# Patient Record
Sex: FEMALE | Race: WHITE | NOT HISPANIC OR LATINO | Employment: OTHER | URBAN - METROPOLITAN AREA
[De-identification: names, ages, dates, MRNs, and addresses within clinical notes are randomized per-mention and may not be internally consistent; named-entity substitution may affect disease eponyms.]

---

## 2021-04-08 DIAGNOSIS — Z23 ENCOUNTER FOR IMMUNIZATION: ICD-10-CM

## 2022-10-05 ENCOUNTER — HOSPITAL ENCOUNTER (INPATIENT)
Facility: HOSPITAL | Age: 77
LOS: 3 days | Discharge: HOSPICE | DRG: 392 | End: 2022-10-09
Attending: EMERGENCY MEDICINE | Admitting: FAMILY MEDICINE
Payer: COMMERCIAL

## 2022-10-05 ENCOUNTER — APPOINTMENT (EMERGENCY)
Dept: RADIOLOGY | Facility: HOSPITAL | Age: 77
DRG: 392 | End: 2022-10-05
Payer: COMMERCIAL

## 2022-10-05 DIAGNOSIS — C34.90 LUNG CANCER (HCC): ICD-10-CM

## 2022-10-05 DIAGNOSIS — A49.8 CLOSTRIDIUM DIFFICILE INFECTION: ICD-10-CM

## 2022-10-05 DIAGNOSIS — R11.2 NAUSEA & VOMITING: Primary | ICD-10-CM

## 2022-10-05 DIAGNOSIS — R53.1 GENERALIZED WEAKNESS: ICD-10-CM

## 2022-10-05 PROBLEM — R11.14 BILIOUS VOMITING WITH NAUSEA: Status: ACTIVE | Noted: 2022-10-05

## 2022-10-05 PROBLEM — J90 PLEURAL EFFUSION: Status: ACTIVE | Noted: 2022-10-05

## 2022-10-05 PROBLEM — E83.42 HYPOMAGNESEMIA: Status: ACTIVE | Noted: 2022-10-05

## 2022-10-05 PROBLEM — A04.72 CLOSTRIDIUM DIFFICILE COLITIS: Status: ACTIVE | Noted: 2022-10-05

## 2022-10-05 LAB
ALBUMIN SERPL BCP-MCNC: 3.4 G/DL (ref 3.5–5)
ALP SERPL-CCNC: 61 U/L (ref 46–116)
ALT SERPL W P-5'-P-CCNC: 11 U/L (ref 12–78)
ANION GAP SERPL CALCULATED.3IONS-SCNC: 8 MMOL/L (ref 4–13)
AST SERPL W P-5'-P-CCNC: 28 U/L (ref 5–45)
BASOPHILS # BLD AUTO: 0.03 THOUSANDS/ΜL (ref 0–0.1)
BASOPHILS NFR BLD AUTO: 0 % (ref 0–1)
BILIRUB SERPL-MCNC: 0.34 MG/DL (ref 0.2–1)
BUN SERPL-MCNC: 9 MG/DL (ref 5–25)
CALCIUM ALBUM COR SERPL-MCNC: 9.1 MG/DL (ref 8.3–10.1)
CALCIUM SERPL-MCNC: 8.6 MG/DL (ref 8.3–10.1)
CHLORIDE SERPL-SCNC: 101 MMOL/L (ref 96–108)
CO2 SERPL-SCNC: 30 MMOL/L (ref 21–32)
CREAT SERPL-MCNC: 0.83 MG/DL (ref 0.6–1.3)
EOSINOPHIL # BLD AUTO: 0.03 THOUSAND/ΜL (ref 0–0.61)
EOSINOPHIL NFR BLD AUTO: 0 % (ref 0–6)
ERYTHROCYTE [DISTWIDTH] IN BLOOD BY AUTOMATED COUNT: 13.3 % (ref 11.6–15.1)
GFR SERPL CREATININE-BSD FRML MDRD: 69 ML/MIN/1.73SQ M
GLUCOSE SERPL-MCNC: 136 MG/DL (ref 65–140)
HCT VFR BLD AUTO: 42.9 % (ref 34.8–46.1)
HGB BLD-MCNC: 13.9 G/DL (ref 11.5–15.4)
IMM GRANULOCYTES # BLD AUTO: 0.06 THOUSAND/UL (ref 0–0.2)
IMM GRANULOCYTES NFR BLD AUTO: 1 % (ref 0–2)
LYMPHOCYTES # BLD AUTO: 0.76 THOUSANDS/ΜL (ref 0.6–4.47)
LYMPHOCYTES NFR BLD AUTO: 8 % (ref 14–44)
MAGNESIUM SERPL-MCNC: 1.5 MG/DL (ref 1.6–2.6)
MCH RBC QN AUTO: 29.9 PG (ref 26.8–34.3)
MCHC RBC AUTO-ENTMCNC: 32.4 G/DL (ref 31.4–37.4)
MCV RBC AUTO: 92 FL (ref 82–98)
MONOCYTES # BLD AUTO: 0.52 THOUSAND/ΜL (ref 0.17–1.22)
MONOCYTES NFR BLD AUTO: 5 % (ref 4–12)
NEUTROPHILS # BLD AUTO: 8.38 THOUSANDS/ΜL (ref 1.85–7.62)
NEUTS SEG NFR BLD AUTO: 86 % (ref 43–75)
NRBC BLD AUTO-RTO: 0 /100 WBCS
PLATELET # BLD AUTO: 301 THOUSANDS/UL (ref 149–390)
PMV BLD AUTO: 8.9 FL (ref 8.9–12.7)
POTASSIUM SERPL-SCNC: 3.7 MMOL/L (ref 3.5–5.3)
PROT SERPL-MCNC: 7.2 G/DL (ref 6.4–8.4)
RBC # BLD AUTO: 4.65 MILLION/UL (ref 3.81–5.12)
SODIUM SERPL-SCNC: 139 MMOL/L (ref 135–147)
WBC # BLD AUTO: 9.78 THOUSAND/UL (ref 4.31–10.16)

## 2022-10-05 PROCEDURE — 85025 COMPLETE CBC W/AUTO DIFF WBC: CPT | Performed by: PHYSICIAN ASSISTANT

## 2022-10-05 PROCEDURE — 70450 CT HEAD/BRAIN W/O DYE: CPT

## 2022-10-05 PROCEDURE — 99220 PR INITIAL OBSERVATION CARE/DAY 70 MINUTES: CPT | Performed by: FAMILY MEDICINE

## 2022-10-05 PROCEDURE — 96360 HYDRATION IV INFUSION INIT: CPT

## 2022-10-05 PROCEDURE — 96374 THER/PROPH/DIAG INJ IV PUSH: CPT

## 2022-10-05 PROCEDURE — 93005 ELECTROCARDIOGRAM TRACING: CPT

## 2022-10-05 PROCEDURE — 99285 EMERGENCY DEPT VISIT HI MDM: CPT

## 2022-10-05 PROCEDURE — 36415 COLL VENOUS BLD VENIPUNCTURE: CPT | Performed by: PHYSICIAN ASSISTANT

## 2022-10-05 PROCEDURE — 96361 HYDRATE IV INFUSION ADD-ON: CPT

## 2022-10-05 PROCEDURE — 80053 COMPREHEN METABOLIC PANEL: CPT | Performed by: PHYSICIAN ASSISTANT

## 2022-10-05 PROCEDURE — 99285 EMERGENCY DEPT VISIT HI MDM: CPT | Performed by: PHYSICIAN ASSISTANT

## 2022-10-05 PROCEDURE — 83735 ASSAY OF MAGNESIUM: CPT | Performed by: PHYSICIAN ASSISTANT

## 2022-10-05 PROCEDURE — 96375 TX/PRO/DX INJ NEW DRUG ADDON: CPT

## 2022-10-05 PROCEDURE — G1004 CDSM NDSC: HCPCS

## 2022-10-05 PROCEDURE — 74177 CT ABD & PELVIS W/CONTRAST: CPT

## 2022-10-05 PROCEDURE — 71260 CT THORAX DX C+: CPT

## 2022-10-05 RX ORDER — ONDANSETRON 2 MG/ML
4 INJECTION INTRAMUSCULAR; INTRAVENOUS EVERY 6 HOURS PRN
Status: DISCONTINUED | OUTPATIENT
Start: 2022-10-05 | End: 2022-10-09 | Stop reason: HOSPADM

## 2022-10-05 RX ORDER — ACETAMINOPHEN 325 MG/1
650 TABLET ORAL EVERY 6 HOURS PRN
Status: DISCONTINUED | OUTPATIENT
Start: 2022-10-05 | End: 2022-10-09 | Stop reason: HOSPADM

## 2022-10-05 RX ORDER — GINSENG 100 MG
1 CAPSULE ORAL ONCE
Status: COMPLETED | OUTPATIENT
Start: 2022-10-05 | End: 2022-10-05

## 2022-10-05 RX ORDER — VANCOMYCIN HYDROCHLORIDE 125 MG/1
125 CAPSULE ORAL
COMMUNITY

## 2022-10-05 RX ORDER — PANTOPRAZOLE SODIUM 40 MG/1
40 TABLET, DELAYED RELEASE ORAL
Status: DISCONTINUED | OUTPATIENT
Start: 2022-10-06 | End: 2022-10-09 | Stop reason: HOSPADM

## 2022-10-05 RX ORDER — MORPHINE SULFATE 15 MG/1
7.5 TABLET ORAL AS NEEDED
COMMUNITY

## 2022-10-05 RX ORDER — CIPROFLOXACIN 2 MG/ML
400 INJECTION, SOLUTION INTRAVENOUS EVERY 12 HOURS
Status: DISCONTINUED | OUTPATIENT
Start: 2022-10-05 | End: 2022-10-09

## 2022-10-05 RX ORDER — METRONIDAZOLE 500 MG/100ML
500 INJECTION, SOLUTION INTRAVENOUS EVERY 8 HOURS
Status: DISCONTINUED | OUTPATIENT
Start: 2022-10-05 | End: 2022-10-09

## 2022-10-05 RX ORDER — HYDROMORPHONE HCL/PF 1 MG/ML
0.2 SYRINGE (ML) INJECTION
Status: DISCONTINUED | OUTPATIENT
Start: 2022-10-05 | End: 2022-10-09 | Stop reason: HOSPADM

## 2022-10-05 RX ORDER — ENOXAPARIN SODIUM 100 MG/ML
40 INJECTION SUBCUTANEOUS DAILY
Status: DISCONTINUED | OUTPATIENT
Start: 2022-10-06 | End: 2022-10-09 | Stop reason: HOSPADM

## 2022-10-05 RX ORDER — HYDROMORPHONE HCL/PF 1 MG/ML
0.5 SYRINGE (ML) INJECTION
Status: DISCONTINUED | OUTPATIENT
Start: 2022-10-05 | End: 2022-10-09 | Stop reason: HOSPADM

## 2022-10-05 RX ORDER — GABAPENTIN 300 MG/1
300 CAPSULE ORAL 2 TIMES DAILY
Status: DISCONTINUED | OUTPATIENT
Start: 2022-10-05 | End: 2022-10-09 | Stop reason: HOSPADM

## 2022-10-05 RX ORDER — MAGNESIUM SULFATE HEPTAHYDRATE 40 MG/ML
2 INJECTION, SOLUTION INTRAVENOUS ONCE
Status: COMPLETED | OUTPATIENT
Start: 2022-10-05 | End: 2022-10-06

## 2022-10-05 RX ORDER — DEXTROSE, SODIUM CHLORIDE, AND POTASSIUM CHLORIDE 5; .9; .15 G/100ML; G/100ML; G/100ML
50 INJECTION INTRAVENOUS CONTINUOUS
Status: DISPENSED | OUTPATIENT
Start: 2022-10-05 | End: 2022-10-06

## 2022-10-05 RX ORDER — HYDROMORPHONE HCL/PF 1 MG/ML
1 SYRINGE (ML) INJECTION ONCE
Status: COMPLETED | OUTPATIENT
Start: 2022-10-05 | End: 2022-10-05

## 2022-10-05 RX ORDER — LEVOTHYROXINE SODIUM 0.05 MG/1
50 TABLET ORAL
Status: DISCONTINUED | OUTPATIENT
Start: 2022-10-06 | End: 2022-10-06

## 2022-10-05 RX ORDER — ONDANSETRON 2 MG/ML
4 INJECTION INTRAMUSCULAR; INTRAVENOUS ONCE
Status: COMPLETED | OUTPATIENT
Start: 2022-10-05 | End: 2022-10-05

## 2022-10-05 RX ADMIN — SODIUM CHLORIDE 1000 ML: 0.9 INJECTION, SOLUTION INTRAVENOUS at 15:55

## 2022-10-05 RX ADMIN — HYDROMORPHONE HYDROCHLORIDE 1 MG: 1 INJECTION, SOLUTION INTRAMUSCULAR; INTRAVENOUS; SUBCUTANEOUS at 15:46

## 2022-10-05 RX ADMIN — Medication 125 MG: at 20:02

## 2022-10-05 RX ADMIN — HYDROMORPHONE HYDROCHLORIDE 0.5 MG: 1 INJECTION, SOLUTION INTRAMUSCULAR; INTRAVENOUS; SUBCUTANEOUS at 23:11

## 2022-10-05 RX ADMIN — BACITRACIN 1 SMALL APPLICATION: 500 OINTMENT TOPICAL at 17:28

## 2022-10-05 RX ADMIN — ONDANSETRON 4 MG: 2 INJECTION INTRAMUSCULAR; INTRAVENOUS at 15:44

## 2022-10-05 RX ADMIN — IOHEXOL 100 ML: 350 INJECTION, SOLUTION INTRAVENOUS at 16:30

## 2022-10-05 RX ADMIN — CIPROFLOXACIN 400 MG: 2 INJECTION, SOLUTION INTRAVENOUS at 20:24

## 2022-10-05 RX ADMIN — MAGNESIUM SULFATE HEPTAHYDRATE 2 G: 40 INJECTION, SOLUTION INTRAVENOUS at 23:50

## 2022-10-05 RX ADMIN — ACETAMINOPHEN 650 MG: 325 TABLET ORAL at 20:02

## 2022-10-05 RX ADMIN — GABAPENTIN 300 MG: 300 CAPSULE ORAL at 20:02

## 2022-10-05 RX ADMIN — Medication 125 MG: at 23:58

## 2022-10-05 RX ADMIN — METRONIDAZOLE 500 MG: 500 SOLUTION INTRAVENOUS at 22:50

## 2022-10-05 RX ADMIN — DEXTROSE, SODIUM CHLORIDE, AND POTASSIUM CHLORIDE 50 ML/HR: 5; .9; .15 INJECTION INTRAVENOUS at 19:12

## 2022-10-05 RX ADMIN — ONDANSETRON 4 MG: 2 INJECTION INTRAMUSCULAR; INTRAVENOUS at 23:12

## 2022-10-05 NOTE — ED NOTES
Unable to access port due to pt having no card to show proof of power port and no hospital documentation to verify       Vidal Nair RN  10/05/22 4723

## 2022-10-05 NOTE — ASSESSMENT & PLAN NOTE
On hospice with [facility]  Sent in by hospice nurse due to above     · Discussed abnormalities noted on CT scan; unclear if these are new or chronic findings  · Obtain medical records for further clarification

## 2022-10-05 NOTE — ASSESSMENT & PLAN NOTE
CT shows small to moderate left-sided pleural effusion   Occasionally uses home O2 at 2 LPM  Currently on 2 LPM with oxygen saturations in the mid 90s     · Continual supplemental oxygen  · Caution with IVF  · Obtain outpatient records to determine if this is acute or chronic in nature

## 2022-10-05 NOTE — ASSESSMENT & PLAN NOTE
Discharged from [facility] on Saturday with C Diff Colitis  Sent home on PO Ciprofloxacin, Flagyl, and Vancomycin  Unsure how much longer she has until she completes her course  CT: "Colonic diverticulosis with mild wall thickening involving the sigmoid colon which may represent acute or chronic changes related to diverticulitis    No pericolonic inflammatory changes are noted "  · Will start IV Ciprofloxacin and Flagyl   · Will start PO Vancomycin  · Recheck C Diff   · Obtain medical records from [facility] to determine length of antibiotics

## 2022-10-05 NOTE — ASSESSMENT & PLAN NOTE
Hst of lung cancer on hospice with recent diagnosis of C Diff Colitis  Discharged home from [facility] on Saturday  Since then she has been nauseous with vomiting and diarrhea  She has been unable to eat or drink  Now with weakness and falls     · Change antibiotics from PO to IV while hospitalized   · Start gentle IVF with D5 NS + 20 mEq KCL at 50 mL/hr for 20 hours   · Clear liquids, toast, and crackers as tolerated   · Supportive care with IV Zofran

## 2022-10-05 NOTE — ASSESSMENT & PLAN NOTE
With falls 2/2 to nausea, vomiting, and diarrhea  Unable to eat or drink for several days    · Supportive care as above  · PT/OT jacquieal

## 2022-10-05 NOTE — ED NOTES
Pt's nurse from [address] [place] [address], Freya Nguyễn [phone], was called to see which kind of port the pt has  She believes it is a power port and medication can be given through it, but blood may not be obtained        Noris Andrade RN  10/05/22 3076

## 2022-10-05 NOTE — ED PROVIDER NOTES
History  Chief Complaint   Patient presents with   • Vomiting     Hospice pt for lung cancer, discharged from [facility] 5 days ago, unable to keep anything down, vomiting, was dx with diverticulitis and c diff     82-year-old female, currently in hospice for lung cancer, presenting today with persistent nausea vomiting and diarrhea over the past week  Was discharged from [facility] 5 days ago, was diagnosed with C diff and diverticulitis  Has been on 3 antibiotics including Flagyl, Cipro and vanco   Has had difficult tolerating any type of medications liquids or solids  Some abdominal discomfort, states that she cannot tolerate the p o  Morphine as it is a liquid  States that she is on hospice however wants treatment for non cancer related issues  Has ongoing abdominal discomfort  Has had multiple falls, states did not hit her head or lose consciousness, she does notice skin tear to the right forearm  Currently does not have any pain  She lives at home by self however does have family members and visiting home nurses and aides that check on her  Denies fevers, shortness breath, chest pain  Prior to Admission Medications   Prescriptions Last Dose Informant Patient Reported? Taking? Ciprofloxacin (CIPRO PO)   Yes Yes   Sig: Take by mouth   metroNIDAZOLE (FLAGYL PO)   Yes Yes   Sig: Take by mouth   morphine (MSIR) 15 mg tablet   Yes Yes   Sig: Take 7 5 mg by mouth as needed for severe pain   vancomycin (VANCOCIN) 125 MG capsule   Yes Yes   Sig: Take 125 mg by mouth      Facility-Administered Medications: None       Past Medical History:   Diagnosis Date   • C  difficile colitis    • Diverticulitis    • Lung cancer [facility])        Past Surgical History:   Procedure Laterality Date   • GASTRIC RESTRICTION SURGERY         History reviewed  No pertinent family history  I have reviewed and agree with the history as documented      E-Cigarette/Vaping   • E-Cigarette Use Never User E-Cigarette/Vaping Substances     Social History     Tobacco Use   • Smoking status: Former Smoker   • Smokeless tobacco: Never Used   Vaping Use   • Vaping Use: Never used   Substance Use Topics   • Alcohol use: Not Currently   • Drug use: Not Currently       Review of Systems   Constitutional: Negative  Negative for chills, fatigue and fever  HENT: Negative  Negative for congestion, postnasal drip, rhinorrhea and sore throat  Eyes: Negative  Respiratory: Negative  Negative for cough, shortness of breath and wheezing  Cardiovascular: Negative  Gastrointestinal: Positive for abdominal pain, diarrhea, nausea and vomiting  Negative for abdominal distention, anal bleeding, blood in stool, constipation and rectal pain  Endocrine: Negative  Genitourinary: Negative  Musculoskeletal: Negative  Skin: Positive for color change and wound  Negative for pallor and rash  Neurological: Negative  Hematological: Negative  Psychiatric/Behavioral: Negative  All other systems reviewed and are negative  Physical Exam  Physical Exam  Vitals and nursing note reviewed  Constitutional:       General: She is not in acute distress  Appearance: She is well-developed  She is ill-appearing  She is not diaphoretic  HENT:      Head: Normocephalic and atraumatic  Right Ear: External ear normal       Left Ear: External ear normal       Nose: Nose normal       Mouth/Throat:      Pharynx: No oropharyngeal exudate  Eyes:      General: No scleral icterus  Right eye: No discharge  Left eye: No discharge  Conjunctiva/sclera: Conjunctivae normal       Pupils: Pupils are equal, round, and reactive to light  Cardiovascular:      Rate and Rhythm: Normal rate and regular rhythm  Pulses: Normal pulses  Heart sounds: Normal heart sounds  No murmur heard  No friction rub  No gallop  Pulmonary:      Effort: Pulmonary effort is normal  No respiratory distress  Breath sounds: Normal breath sounds  No stridor  No wheezing, rhonchi or rales  Comments: S PO2 is 97% indicating adequate oxygenation  Chest:      Chest wall: No tenderness  Abdominal:      General: Abdomen is flat  Bowel sounds are normal  There is no distension  Palpations: Abdomen is soft  There is no mass  Tenderness: There is no abdominal tenderness  There is no guarding or rebound  Hernia: No hernia is present  Comments: No visible abnormality, decreased bowel sounds minimal crampy-like tenderness diffusely  Musculoskeletal:      Cervical back: Normal range of motion and neck supple  Lymphadenopathy:      Cervical: No cervical adenopathy  Skin:     General: Skin is warm and dry  Capillary Refill: Capillary refill takes less than 2 seconds  Coloration: Skin is not jaundiced or pale  Findings: No bruising, erythema, lesion or rash  Neurological:      General: No focal deficit present  Mental Status: She is alert and oriented to person, place, and time  Mental status is at baseline           Vital Signs  ED Triage Vitals [10/05/22 1437]   Temperature Pulse Respirations Blood Pressure SpO2   98 9 °F (37 2 °C) 69 22 (!) 187/94 97 %      Temp Source Heart Rate Source Patient Position - Orthostatic VS BP Location FiO2 (%)   Tympanic Monitor Sitting Left arm --      Pain Score       7           Vitals:    10/05/22 1437 10/05/22 1600 10/05/22 1645   BP: (!) 187/94 118/64 142/72   Pulse: 69 76 69   Patient Position - Orthostatic VS: Sitting  Lying         Visual Acuity      ED Medications  Medications   sodium chloride 0 9 % bolus 1,000 mL (1,000 mL Intravenous New Bag 10/5/22 1555)   ondansetron (ZOFRAN) injection 4 mg (4 mg Intravenous Given 10/5/22 1544)   HYDROmorphone (DILAUDID) injection 1 mg (1 mg Intravenous Given 10/5/22 1546)   iohexol (OMNIPAQUE) 350 MG/ML injection (SINGLE-DOSE) 100 mL (100 mL Intravenous Given 10/5/22 1630)   bacitracin topical ointment 1 small application (1 small application Topical Given 10/5/22 8943)       Diagnostic Studies  Results Reviewed     Procedure Component Value Units Date/Time    Comprehensive metabolic panel [[id number]]  (Abnormal) Collected: 10/05/22 1549    Lab Status: Final result Specimen: Blood from Arm, Left Updated: 10/05/22 1609     Sodium 139 mmol/L      Potassium 3 7 mmol/L      Chloride 101 mmol/L      CO2 30 mmol/L      ANION GAP 8 mmol/L      BUN 9 mg/dL      Creatinine 0 83 mg/dL      Glucose 136 mg/dL      Calcium 8 6 mg/dL      Corrected Calcium 9 1 mg/dL      AST 28 U/L      ALT 11 U/L      Alkaline Phosphatase 61 U/L      Total Protein 7 2 g/dL      Albumin 3 4 g/dL      Total Bilirubin 0 34 mg/dL      eGFR 69 ml/min/1 73sq m     Narrative:      National Kid[employer] Disease Foundation guidelines for Chronic Kidney Disease (CKD):   •  Stage 1 with normal or high GFR (GFR > 90 mL/min/1 73 square meters)  •  Stage 2 Mild CKD (GFR = 60-89 mL/min/1 73 square meters)  •  Stage 3A Moderate CKD (GFR = 45-59 mL/min/1 73 square meters)  •  Stage 3B Moderate CKD (GFR = 30-44 mL/min/1 73 square meters)  •  Stage 4 Severe CKD (GFR = 15-29 mL/min/1 73 square meters)  •  Stage 5 End Stage CKD (GFR <15 mL/min/1 73 square meters)  Note: GFR calculation is accurate only with a steady state creatinine    Magnesium [[id number]]  (Abnormal) Collected: 10/05/22 1549    Lab Status: Final result Specimen: Blood from Arm, Left Updated: 10/05/22 1609     Magnesium 1 5 mg/dL     CBC and differential [[id number]]  (Abnormal) Collected: 10/05/22 1549    Lab Status: Final result Specimen: Blood from Arm, Left Updated: 10/05/22 1554     WBC 9 78 Thousand/uL      RBC 4 65 Million/uL      Hemoglobin 13 9 g/dL      Hematocrit 42 9 %      MCV 92 fL      MCH 29 9 pg      MCHC 32 4 g/dL      RDW 13 3 %      MPV 8 9 fL      Platelets 533 Thousands/uL      nRBC 0 /100 WBCs      Neutrophils Relative 86 %      Immat GRANS % 1 %      Lymphocytes Relative 8 % Monocytes Relative 5 %      Eosinophils Relative 0 %      Basophils Relative 0 %      Neutrophils Absolute 8 38 Thousands/µL      Immature Grans Absolute 0 06 Thousand/uL      Lymphocytes Absolute 0 76 Thousands/µL      Monocytes Absolute 0 52 Thousand/µL      Eosinophils Absolute 0 03 Thousand/µL      Basophils Absolute 0 03 Thousands/µL     Clostridium difficile toxin by PCR with EIA [[id number]]     Lab Status: No result Specimen: Stool from Per Rectum                  CT head without contrast   Final Result by Jb Ruth MD (10/05 1658)      Hypodense areas in the left frontal and left occipital lobes which may represent prior infarcts, however, metastatic disease is not excluded in this setting and consider MRI for further evaluation if clinically indicated  Any prior studies would    certainly be helpful in this regard  The study was marked in EPIC for significant notification and follow-up notification  Workstation performed: [id number]         CT chest abdomen pelvis w contrast   Final Result by Jb Ruth MD (10/05 1656)      Background emphysema with postsurgical changes in the left upper lobe  Complete collapse of the left upper lobe with obstruction of the left upper lobe bronchus for which underlying endobronchial lesion is not excluded  Prior exams would be helpful to    assess for stability  Multiple nodules scattered throughout the right lung the largest in the right lower lobe measuring 5 mm  Continued follow-up suggested  Small to moderate left-sided pleural effusion of uncertain etiology  Colonic diverticulosis with mild wall thickening involving the sigmoid colon which may represent acute or chronic changes related to diverticulitis  No pericolonic inflammatory changes are noted              Workstation performed: [id number]                    Procedures  ECG 12 Lead Documentation Only    Date/Time: 10/5/2022 4:12 PM  Performed by: [facility], [facility]  Authorized by: Rupa Cohen PA-C     Indications / Diagnosis:  Asses QT interval   Patient location:  ED  Interpretation:     Interpretation: normal    Rate:     ECG rate:  73    ECG rate assessment: normal    Rhythm:     Rhythm: sinus rhythm    Ectopy:     Ectopy: none    QRS:     QRS axis:  Normal    QRS intervals:  Normal  Conduction:     Conduction: normal    ST segments:     ST segments:  Non-specific  T waves:     T waves: flattening               ED Course  ED Course as of 10/05/22 1748   Wed Oct 05, 2022   1646 Patient updated  Back from CT scan  MDM  Number of Diagnoses or Management Options  Clostridium difficile infection  Generalized weakness  Nausea & vomiting  Diagnosis management comments: Given fall risk, significant weakness as well as persistent vomiting and diarrhea while at home with difficulty tolerating any medications or food, patient would like to be admitted overnight for observation  Patient once again states that she still is on hospice, does not want any treatment for her cancer related issues, however is willing to be treated for any type of treatable illness such as C diff  Potential brain mets, no shift noted on CT scan          Amount and/or Complexity of Data Reviewed  Clinical lab tests: ordered and reviewed  Tests in the radiology section of CPT®: ordered and reviewed  Review and summarize past medical records: yes  Discuss the patient with other providers: yes  Independent visualization of images, tracings, or specimens: yes        Disposition  Final diagnoses:   Nausea & vomiting   Clostridium difficile infection   Generalized weakness     Time reflects when diagnosis was documented in both MDM as applicable and the Disposition within this note     Time User Action Codes Description Comment    10/5/2022  5:42 PM Dar Babin Add [R11 2] Nausea & vomiting     10/5/2022  5:43 PM Dar Babin Add [A49 8] Clostridium difficile infection     10/5/2022  5:43 PM Montez Lira Marcela [R53 1] Generalized weakness       ED Disposition     ED Disposition   Admit    Condition   Stable    Date/Time   Wed Oct 5, 2022  5:42 PM    Comment   Case was discussed with Dr Kasandra Sagastume and the patient's admission status was agreed to be Admission Status: observation status to the service of Dr Kasandra Sagastume   Follow-up Information    None         Patient's Medications   Discharge Prescriptions    No medications on file       No discharge procedures on file      PDMP Review     None          ED Provider  Electronically Signed by           [facility]  10/05/22 5186

## 2022-10-05 NOTE — H&P
Kailash 47  H&P- Simon Apgar 1945, 78 y o  female MRN: [id number]  Unit/Bed#: ED 05 Encounter: [id number]  Primary Care Provider: No primary care provider on file  Date and time admitted to hospital: 10/5/2022  2:42 PM    Bilious vomiting with nausea  Assessment & Plan  Hst of lung cancer on hospice with recent diagnosis of C Diff Colitis  Discharged home from [facility] on Saturday  Since then she has been nauseous with vomiting and diarrhea  She has been unable to eat or drink  Now with weakness and falls  · Change antibiotics from PO to IV while hospitalized   · Start gentle IVF with D5 NS + 20 mEq KCL at 50 mL/hr for 20 hours   · Clear liquids, toast, and crackers as tolerated   · Supportive care with IV Zofran     Clostridium difficile colitis  Assessment & Plan  Discharged from [facility] on Saturday with C Diff Colitis  Sent home on PO Ciprofloxacin, Flagyl, and Vancomycin  Unsure how much longer she has until she completes her course  CT: "Colonic diverticulosis with mild wall thickening involving the sigmoid colon which may represent acute or chronic changes related to diverticulitis  No pericolonic inflammatory changes are noted "  · Will start IV Ciprofloxacin and Flagyl   · Will start PO Vancomycin  · Recheck C Diff   · Obtain medical records from [facility] to determine length of antibiotics     Hypomagnesemia  Assessment & Plan  Mag 1 5, likely due to N/V/D   · Replete with Mag Sulfate 2 gm IV x1  · Replete potassium     Pleural effusion  Assessment & Plan  CT shows small to moderate left-sided pleural effusion   Occasionally uses home O2 at 2 LPM  Currently on 2 LPM with oxygen saturations in the mid 90s  · Continual supplemental oxygen  · Caution with IVF  · Obtain outpatient records to determine if this is acute or chronic in nature     Lung cancer [facility])  Assessment & Plan  On hospice with [facility]  Sent in by hospice nurse due to above     · Discussed abnormalities noted on CT scan; unclear if these are new or chronic findings  · Obtain medical records for further clarification     Generalized weakness  Assessment & Plan  With falls 2/2 to nausea, vomiting, and diarrhea  Unable to eat or drink for several days  · Supportive care as above  · PT/OT eval      VTE Pharmacologic Prophylaxis:   Moderate Risk (Score 3-4) - Pharmacological DVT Prophylaxis Ordered: enoxaparin (Lovenox)  Code Status: Level 3 - DNAR and DNI level 3  Discussion with family: Patient declined call to   Anticipated Length of Stay: Patient will be admitted on an observation basis with an anticipated length of stay of less than 2 midnights secondary to N/V, colitis   Total Time for Visit, including Counseling / Coordination of Care: 60 minutes Greater than 50% of this total time spent on direct patient counseling and coordination of care  Chief Complaint: nausea, vomiting, diarrhea, unable to eat or drinking     History of Present Illness:  Amanda Miller is a 78 y o  female with a PMH of lung cancer on hospice, GERD< hypothyroidism who presents with nausea, vomiting, and diarrhea  Patient was recently admitted to [facility] for C  Diff Colitis  She was discharged home on Saturday on Flagyl, Cipro, and Vancomycin  She has not been able to eat or drinking since coming home  She is now very weak and falling  Her hospice nurse recommended seeking medical attention at [facility] as she was unhappy with her care at [facility]  Workup revealed Mag 1 5  CT of chest with collapse of left upper lobe with obstruction of the left upper lobe bronchus, unclear if this is acute or chronic  CT abdomen revealed colonic diverticulosis with mild wall thickening  CT head revealed hypodense areas in the left frontal and left occipital lobes which may represent prior infarcts vs metastasis  Reviewed CT findings with patient   After discussion, the plan will be to obtain medical records from [facility] for comparison  At this time, patient does not want to be aggressive with her lung cancer  She wants to focus on treatment for her colitis  Review of Systems:  Review of Systems   Constitutional: Positive for activity change, appetite change and fatigue  Negative for chills and fever  HENT: Negative for sore throat  Eyes: Negative for visual disturbance  Respiratory: Negative for cough, shortness of breath and wheezing  Cardiovascular: Negative for chest pain, palpitations and leg swelling  Gastrointestinal: Positive for abdominal pain, diarrhea, nausea and vomiting  Negative for abdominal distention  Genitourinary: Negative for dysuria and hematuria  Musculoskeletal: Positive for gait problem  Negative for arthralgias and back pain  Skin: Negative for rash and wound  Neurological: Positive for weakness  Negative for seizures and syncope  Psychiatric/Behavioral: Negative for confusion  All other systems reviewed and are negative  Past Medical and Surgical History:   Past Medical History:   Diagnosis Date   • C  difficile colitis    • Diverticulitis    • Lung cancer [facility])        Past Surgical History:   Procedure Laterality Date   • GASTRIC RESTRICTION SURGERY         Meds/Allergies:  Prior to Admission medications    Medication Sig Start Date End Date Taking? Authorizing Provider   Ciprofloxacin (CIPRO PO) Take by mouth   Yes Historical Provider, MD   metroNIDAZOLE (FLAGYL PO) Take by mouth   Yes Historical Provider, MD   morphine (MSIR) 15 mg tablet Take 7 5 mg by mouth as needed for severe pain   Yes Historical Provider, MD   vancomycin (VANCOCIN) 125 MG capsule Take 125 mg by mouth   Yes Historical Provider, MD     I have reviewed home medications with patient personally      Allergies: No Known Allergies    Social History:  Marital Status: Unknown   Occupation: retired   Patient Pre-hospital Living Situation: Home  Patient Pre-hospital Level of Mobility: walks  Patient Pre-hospital Diet Restrictions: none  Substance Use History:   Social History     Substance and Sexual Activity   Alcohol Use Not Currently     Social History     Tobacco Use   Smoking Status Former Smoker   Smokeless Tobacco Never Used     Social History     Substance and Sexual Activity   Drug Use Not Currently       Family History:  History reviewed  No pertinent family history  Physical Exam:     Vitals:   Blood Pressure: 142/72 (10/05/22 1645)  Pulse: 69 (10/05/22 1645)  Temperature: 98 9 °F (37 2 °C) (10/05/22 1437)  Temp Source: Tympanic (10/05/22 1437)  Respirations: 20 (10/05/22 1645)  SpO2: 99 % (10/05/22 1645)    Physical Exam  Vitals and nursing note reviewed  Constitutional:       General: She is not in acute distress  Appearance: She is obese  She is ill-appearing  She is not toxic-appearing or diaphoretic  Comments: Pleasant female resting in bed on 2L NC   HENT:      Head: Normocephalic  Mouth/Throat:      Mouth: Mucous membranes are dry  Eyes:      Conjunctiva/sclera: Conjunctivae normal    Cardiovascular:      Rate and Rhythm: Normal rate  Pulmonary:      Effort: Pulmonary effort is normal  No respiratory distress  Breath sounds: Normal breath sounds  No wheezing, rhonchi or rales  Abdominal:      General: Bowel sounds are normal  There is no distension  Palpations: Abdomen is soft  Tenderness: There is abdominal tenderness  There is no guarding or rebound  Musculoskeletal:         General: Normal range of motion  Cervical back: Normal range of motion  Right lower leg: No edema  Left lower leg: No edema  Skin:     General: Skin is warm and dry  Capillary Refill: Capillary refill takes less than 2 seconds  Neurological:      Mental Status: She is alert and oriented to person, place, and time  Mental status is at baseline  Motor: Weakness present     Psychiatric:         Mood and Affect: Mood normal          Behavior: Behavior normal  Thought Content: Thought content normal           Additional Data:     Lab Results:  Results from last 7 days   Lab Units 10/05/22  1549   WBC Thousand/uL 9 78   HEMOGLOBIN g/dL 13 9   HEMATOCRIT % 42 9   PLATELETS Thousands/uL 301   NEUTROS PCT % 86*   LYMPHS PCT % 8*   MONOS PCT % 5   EOS PCT % 0     Results from last 7 days   Lab Units 10/05/22  1549   SODIUM mmol/L 139   POTASSIUM mmol/L 3 7   CHLORIDE mmol/L 101   CO2 mmol/L 30   BUN mg/dL 9   CREATININE mg/dL 0 83   ANION GAP mmol/L 8   CALCIUM mg/dL 8 6   ALBUMIN g/dL 3 4*   TOTAL BILIRUBIN mg/dL 0 34   ALK PHOS U/L 61   ALT U/L 11*   AST U/L 28   GLUCOSE RANDOM mg/dL 136                       Imaging: Reviewed radiology reports from this admission including: chest CT scan, abdominal/pelvic CT and CT head  CT head without contrast   Final Result by Juan Elizabeth MD (10/05 1658)      Hypodense areas in the left frontal and left occipital lobes which may represent prior infarcts, however, metastatic disease is not excluded in this setting and consider MRI for further evaluation if clinically indicated  Any prior studies would    certainly be helpful in this regard  The study was marked in EPIC for significant notification and follow-up notification  Workstation performed: [id number]         CT chest abdomen pelvis w contrast   Final Result by Juan Elizabeth MD (10/05 1656)      Background emphysema with postsurgical changes in the left upper lobe  Complete collapse of the left upper lobe with obstruction of the left upper lobe bronchus for which underlying endobronchial lesion is not excluded  Prior exams would be helpful to    assess for stability  Multiple nodules scattered throughout the right lung the largest in the right lower lobe measuring 5 mm  Continued follow-up suggested  Small to moderate left-sided pleural effusion of uncertain etiology        Colonic diverticulosis with mild wall thickening involving the sigmoid colon which may represent acute or chronic changes related to diverticulitis  No pericolonic inflammatory changes are noted  Workstation performed: [id number]             EKG and Other Studies Reviewed on Admission:   · EKG: NSR  HR 60s  ** Please Note: This note has been constructed using a voice recognition system   **

## 2022-10-06 PROBLEM — B37.0 ORAL THRUSH: Status: ACTIVE | Noted: 2022-10-06

## 2022-10-06 LAB
ALBUMIN SERPL BCP-MCNC: 3 G/DL (ref 3.5–5)
ALP SERPL-CCNC: 56 U/L (ref 46–116)
ALT SERPL W P-5'-P-CCNC: 12 U/L (ref 12–78)
ANION GAP SERPL CALCULATED.3IONS-SCNC: 4 MMOL/L (ref 4–13)
AST SERPL W P-5'-P-CCNC: 22 U/L (ref 5–45)
BASOPHILS # BLD AUTO: 0.05 THOUSANDS/ΜL (ref 0–0.1)
BASOPHILS NFR BLD AUTO: 1 % (ref 0–1)
BILIRUB SERPL-MCNC: 0.36 MG/DL (ref 0.2–1)
BUN SERPL-MCNC: 6 MG/DL (ref 5–25)
CALCIUM ALBUM COR SERPL-MCNC: 9.2 MG/DL (ref 8.3–10.1)
CALCIUM SERPL-MCNC: 8.4 MG/DL (ref 8.3–10.1)
CHLORIDE SERPL-SCNC: 102 MMOL/L (ref 96–108)
CO2 SERPL-SCNC: 33 MMOL/L (ref 21–32)
CREAT SERPL-MCNC: 0.8 MG/DL (ref 0.6–1.3)
EOSINOPHIL # BLD AUTO: 0.17 THOUSAND/ΜL (ref 0–0.61)
EOSINOPHIL NFR BLD AUTO: 2 % (ref 0–6)
ERYTHROCYTE [DISTWIDTH] IN BLOOD BY AUTOMATED COUNT: 13.5 % (ref 11.6–15.1)
GFR SERPL CREATININE-BSD FRML MDRD: 72 ML/MIN/1.73SQ M
GLUCOSE P FAST SERPL-MCNC: 110 MG/DL (ref 65–99)
GLUCOSE SERPL-MCNC: 110 MG/DL (ref 65–140)
HCT VFR BLD AUTO: 40.8 % (ref 34.8–46.1)
HGB BLD-MCNC: 12.8 G/DL (ref 11.5–15.4)
IMM GRANULOCYTES # BLD AUTO: 0.02 THOUSAND/UL (ref 0–0.2)
IMM GRANULOCYTES NFR BLD AUTO: 0 % (ref 0–2)
LYMPHOCYTES # BLD AUTO: 1.38 THOUSANDS/ΜL (ref 0.6–4.47)
LYMPHOCYTES NFR BLD AUTO: 16 % (ref 14–44)
MAGNESIUM SERPL-MCNC: 1.9 MG/DL (ref 1.6–2.6)
MCH RBC QN AUTO: 29.5 PG (ref 26.8–34.3)
MCHC RBC AUTO-ENTMCNC: 31.4 G/DL (ref 31.4–37.4)
MCV RBC AUTO: 94 FL (ref 82–98)
MONOCYTES # BLD AUTO: 0.74 THOUSAND/ΜL (ref 0.17–1.22)
MONOCYTES NFR BLD AUTO: 9 % (ref 4–12)
NEUTROPHILS # BLD AUTO: 6.04 THOUSANDS/ΜL (ref 1.85–7.62)
NEUTS SEG NFR BLD AUTO: 72 % (ref 43–75)
NRBC BLD AUTO-RTO: 0 /100 WBCS
PHOSPHATE SERPL-MCNC: 2.6 MG/DL (ref 2.3–4.1)
PLATELET # BLD AUTO: 287 THOUSANDS/UL (ref 149–390)
PMV BLD AUTO: 9 FL (ref 8.9–12.7)
POTASSIUM SERPL-SCNC: 3.3 MMOL/L (ref 3.5–5.3)
PROT SERPL-MCNC: 6.2 G/DL (ref 6.4–8.4)
RBC # BLD AUTO: 4.34 MILLION/UL (ref 3.81–5.12)
SODIUM SERPL-SCNC: 139 MMOL/L (ref 135–147)
WBC # BLD AUTO: 8.4 THOUSAND/UL (ref 4.31–10.16)

## 2022-10-06 PROCEDURE — 99232 SBSQ HOSP IP/OBS MODERATE 35: CPT | Performed by: NURSE PRACTITIONER

## 2022-10-06 PROCEDURE — 84100 ASSAY OF PHOSPHORUS: CPT | Performed by: NURSE PRACTITIONER

## 2022-10-06 PROCEDURE — 97163 PT EVAL HIGH COMPLEX 45 MIN: CPT

## 2022-10-06 PROCEDURE — 80053 COMPREHEN METABOLIC PANEL: CPT | Performed by: NURSE PRACTITIONER

## 2022-10-06 PROCEDURE — 85025 COMPLETE CBC W/AUTO DIFF WBC: CPT | Performed by: NURSE PRACTITIONER

## 2022-10-06 PROCEDURE — 83735 ASSAY OF MAGNESIUM: CPT | Performed by: NURSE PRACTITIONER

## 2022-10-06 RX ORDER — LEVOTHYROXINE SODIUM 0.12 MG/1
125 TABLET ORAL DAILY
COMMUNITY

## 2022-10-06 RX ORDER — ALBUTEROL SULFATE 90 UG/1
2 AEROSOL, METERED RESPIRATORY (INHALATION) EVERY 4 HOURS PRN
Status: DISCONTINUED | OUTPATIENT
Start: 2022-10-06 | End: 2022-10-09 | Stop reason: HOSPADM

## 2022-10-06 RX ORDER — MAGNESIUM HYDROXIDE/ALUMINUM HYDROXICE/SIMETHICONE 120; 1200; 1200 MG/30ML; MG/30ML; MG/30ML
30 SUSPENSION ORAL EVERY 4 HOURS PRN
Status: DISCONTINUED | OUTPATIENT
Start: 2022-10-06 | End: 2022-10-09 | Stop reason: HOSPADM

## 2022-10-06 RX ORDER — METOCLOPRAMIDE HYDROCHLORIDE 5 MG/ML
10 INJECTION INTRAMUSCULAR; INTRAVENOUS EVERY 6 HOURS PRN
Status: DISCONTINUED | OUTPATIENT
Start: 2022-10-06 | End: 2022-10-07

## 2022-10-06 RX ORDER — POTASSIUM CHLORIDE 20 MEQ/1
40 TABLET, EXTENDED RELEASE ORAL ONCE
Status: COMPLETED | OUTPATIENT
Start: 2022-10-06 | End: 2022-10-06

## 2022-10-06 RX ORDER — DEXTROSE, SODIUM CHLORIDE, AND POTASSIUM CHLORIDE 5; .9; .15 G/100ML; G/100ML; G/100ML
50 INJECTION INTRAVENOUS CONTINUOUS
Status: DISCONTINUED | OUTPATIENT
Start: 2022-10-06 | End: 2022-10-07

## 2022-10-06 RX ORDER — LEVOTHYROXINE SODIUM 0.12 MG/1
125 TABLET ORAL
Status: DISCONTINUED | OUTPATIENT
Start: 2022-10-07 | End: 2022-10-09 | Stop reason: HOSPADM

## 2022-10-06 RX ORDER — FLUOXETINE HYDROCHLORIDE 20 MG/1
40 CAPSULE ORAL DAILY
Status: DISCONTINUED | OUTPATIENT
Start: 2022-10-06 | End: 2022-10-09 | Stop reason: HOSPADM

## 2022-10-06 RX ORDER — AMLODIPINE BESYLATE AND BENAZEPRIL HYDROCHLORIDE 5; 10 MG/1; MG/1
1 CAPSULE ORAL DAILY
COMMUNITY

## 2022-10-06 RX ORDER — ALPRAZOLAM 0.5 MG/1
0.5 TABLET ORAL 3 TIMES DAILY PRN
Status: DISCONTINUED | OUTPATIENT
Start: 2022-10-06 | End: 2022-10-09 | Stop reason: HOSPADM

## 2022-10-06 RX ORDER — CLOTRIMAZOLE 10 MG/1
10 LOZENGE ORAL; TOPICAL
Status: DISCONTINUED | OUTPATIENT
Start: 2022-10-06 | End: 2022-10-07

## 2022-10-06 RX ORDER — FLUOXETINE HYDROCHLORIDE 40 MG/1
40 CAPSULE ORAL DAILY
COMMUNITY

## 2022-10-06 RX ADMIN — ONDANSETRON 4 MG: 2 INJECTION INTRAMUSCULAR; INTRAVENOUS at 08:55

## 2022-10-06 RX ADMIN — Medication 125 MG: at 05:48

## 2022-10-06 RX ADMIN — POTASSIUM CHLORIDE 40 MEQ: 1500 TABLET, EXTENDED RELEASE ORAL at 10:25

## 2022-10-06 RX ADMIN — ENOXAPARIN SODIUM 40 MG: 40 INJECTION SUBCUTANEOUS at 08:24

## 2022-10-06 RX ADMIN — DEXTROSE, SODIUM CHLORIDE, AND POTASSIUM CHLORIDE 50 ML/HR: 5; .9; .15 INJECTION INTRAVENOUS at 15:55

## 2022-10-06 RX ADMIN — FLUOXETINE HYDROCHLORIDE 40 MG: 20 CAPSULE ORAL at 15:55

## 2022-10-06 RX ADMIN — ALUMINA, MAGNESIA, AND SIMETHICONE ORAL SUSPENSION REGULAR STRENGTH 30 ML: 1200; 1200; 120 SUSPENSION ORAL at 17:44

## 2022-10-06 RX ADMIN — Medication 125 MG: at 12:21

## 2022-10-06 RX ADMIN — HYDROMORPHONE HYDROCHLORIDE 0.5 MG: 1 INJECTION, SOLUTION INTRAMUSCULAR; INTRAVENOUS; SUBCUTANEOUS at 17:34

## 2022-10-06 RX ADMIN — METRONIDAZOLE 500 MG: 500 SOLUTION INTRAVENOUS at 05:27

## 2022-10-06 RX ADMIN — LEVOTHYROXINE SODIUM 50 MCG: 50 TABLET ORAL at 05:27

## 2022-10-06 RX ADMIN — METOCLOPRAMIDE 10 MG: 5 INJECTION, SOLUTION INTRAMUSCULAR; INTRAVENOUS at 17:34

## 2022-10-06 RX ADMIN — METRONIDAZOLE 500 MG: 500 SOLUTION INTRAVENOUS at 22:22

## 2022-10-06 RX ADMIN — GABAPENTIN 300 MG: 300 CAPSULE ORAL at 08:25

## 2022-10-06 RX ADMIN — CIPROFLOXACIN 400 MG: 2 INJECTION, SOLUTION INTRAVENOUS at 19:34

## 2022-10-06 RX ADMIN — METOPROLOL TARTRATE 25 MG: 25 TABLET, FILM COATED ORAL at 22:29

## 2022-10-06 RX ADMIN — METRONIDAZOLE 500 MG: 500 SOLUTION INTRAVENOUS at 14:02

## 2022-10-06 RX ADMIN — Medication 125 MG: at 17:13

## 2022-10-06 RX ADMIN — HYDROMORPHONE HYDROCHLORIDE 0.5 MG: 1 INJECTION, SOLUTION INTRAMUSCULAR; INTRAVENOUS; SUBCUTANEOUS at 10:25

## 2022-10-06 RX ADMIN — CLOTRIMAZOLE 10 MG: 10 LOZENGE ORAL at 22:22

## 2022-10-06 RX ADMIN — CLOTRIMAZOLE 10 MG: 10 LOZENGE ORAL at 10:25

## 2022-10-06 RX ADMIN — CLOTRIMAZOLE 10 MG: 10 LOZENGE ORAL at 17:13

## 2022-10-06 RX ADMIN — CLOTRIMAZOLE 10 MG: 10 LOZENGE ORAL at 14:02

## 2022-10-06 RX ADMIN — PANTOPRAZOLE SODIUM 40 MG: 40 TABLET, DELAYED RELEASE ORAL at 05:27

## 2022-10-06 RX ADMIN — CIPROFLOXACIN 400 MG: 2 INJECTION, SOLUTION INTRAVENOUS at 08:24

## 2022-10-06 RX ADMIN — GABAPENTIN 300 MG: 300 CAPSULE ORAL at 17:13

## 2022-10-06 NOTE — PLAN OF CARE
Problem: PHYSICAL THERAPY ADULT  Goal: Performs mobility at highest level of function for planned discharge setting  See evaluation for individualized goals  Description: Treatment/Interventions: ADL retraining, Functional transfer training, LE strengthening/ROM, Elevations, Therapeutic exercise, Endurance training, Patient/family training, Equipment eval/education, Bed mobility, Gait training  Equipment Recommended: Lata Coleman       See flowsheet documentation for full assessment, interventions and recommendations  Note: Prognosis: Good  Problem List: Decreased strength, Decreased range of motion, Decreased endurance, Impaired balance, Decreased mobility, Pain  Assessment: Patient seen for Physical Therapy evaluation  Patient admitted with Bilious vomiting with nausea  Comorbidities affecting patient's physical performance include: lung cancer, clostridium difficile colitis, generalized weakness, pleural effusion  Personal factors affecting patient at time of initial evaluation include: stairs to enter home, positive fall history and inability to perform IADLS   Prior to admission, patient was independent with functional mobility without assistive device, independent with ADLS, requiring assist for IADLS and ambulating household distance  Please find objective findings from Physical Therapy assessment regarding body systems outlined above with impairments and limitations including weakness, decreased ROM, impaired balance, decreased endurance, impaired coordination, gait deviations, pain, decreased activity tolerance, decreased functional mobility tolerance and fall risk  The Barthel Index was used as a functional outcome tool presenting with a score of Barthel Index Score: 65 today indicating moderate limitations of functional mobility and ADLS    Patient's clinical presentation is currently unstable/unpredictable as seen in patient's presentation of increased fall risk, new onset of impairment of functional mobility and new onset of weakness  Pt would benefit from continued Physical Therapy treatment to address deficits as defined above and maximize level of functional mobility  As demonstrated by objective findings, the assigned level of complexity for this evaluation is high  The patient's AM-PAC Basic Mobility Inpatient Short Form Raw Score is 18  A Raw score of greater than 16 suggests the patient may benefit from discharge to home  PT Discharge Recommendation: No rehabilitation needs    See flowsheet documentation for full assessment

## 2022-10-06 NOTE — CASE MANAGEMENT
Case Management Assessment & Discharge Planning Note    Patient name Marge Cortez  Location [facility] [address] MRN [id number]  : 1945 Date 10/6/2022       Current Admission Date: 10/5/2022  Current Admission Diagnosis:Bilious vomiting with nausea   Patient Active Problem List    Diagnosis Date Noted   • Oral thrush 10/06/2022   • Hypomagnesemia 10/05/2022   • Clostridium difficile colitis 10/05/2022   • Bilious vomiting with nausea 10/05/2022   • Generalized weakness 10/05/2022   • Lung cancer ([facility] [address] ) 10/05/2022   • Pleural effusion 10/05/2022      LOS (days): 0  Geometric Mean LOS (GMLOS) (days):   Days to GMLOS:     OBJECTIVE:    Risk of Unplanned Readmission Score: 12 32         Current admission status: Inpatient       Preferred Pharmacy:   [address] [place] [address]  Phone: [phone] Fax: [phone]    Primary Care Provider: No primary care provider on file  Primary Insurance: [facility]  Secondary Insurance:     ASSESSMENT:  Active Health Care Proxies    There are no active Health Care Proxies on file  Obs Notice Signed: 10/06/22 (MOON reviewed with patient  Patient gave verbal understanding   She was provided original  Copy placed in scan bin )    Readmission Root Cause  30 Day Readmission: No    Patient Information  Admitted from[de-identified] Home  Mental Status: Alert  During Assessment patient was accompanied by: Not accompanied during assessment  Assessment information provided by[de-identified] Patient  Primary Caregiver: Self  Support Systems: Daughter, Family members, Travis Thomas 63 of Residence: [address] [place] [address] do you live in?: [place]  Type of Current Residence: Apartment (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)  Floor Level: 2  Upon entering residence, is there a bedroom on the main floor (no further steps)?: No  A bedroom is located on the following floor levels of residence (select all that apply):: 2nd Floor  Upon entering residence, is there a bathroom on the main floor (no further steps)?: No  Indicate which floors of current residence have a bathroom (select all the apply):: 2nd Floor  Number of steps to 2nd floor from main floor: One Flight  In the last 12 months, was there a time when you were not able to pay the mortgage or rent on time?: No  In the last 12 months, how many places have you lived?: 1  In the last 12 months, was there a time when you did not have a steady place to sleep or slept in a shelter (including now)?: No  Homeless/housing insecurity resource given?: N/A  Living Arrangements: Lives Alone  Is patient a ?: No    Activities of Daily Living Prior to Admission  Functional Status: Independent  Completes ADLs independently?: No  Level of ADL dependence: Assistance (HHA assists with weekly bathing, laundry, and household chores)  Ambulates independently?: Yes  Does patient use assisted devices?: Yes  Assisted Devices (DME) used: Home Oxygen concentrator, Shower Chair, [facility] Name (respiratory supplies): Supplied by [employer]  O2 Rate(s): 2 5L PRN  Does patient currently own DME?: Yes  Does patient have a history of Outpatient Therapy (PT/OT)?: No  Does the patient have a history of Short-Term Rehab?: No  Does patient have a history of HHC?: Yes  Does patient currently have [facility]?: Yes    Current [address] [place] [employer] [facility]  Type of Current Home Care Services: Hospice (3x week HHA visit, 1x week nurse visit; patient has been on service since 11/2021)  [address] [place] [address][de-identified] Other (please enter name in comment) ([facility])    Patient Information Continued  Income Source: Pension/group home  Does patient have prescription coverage?: Yes (Pt uses [address])  Within the past 12 months, you worried that your food would run out before you got the money to buy more : Never true  Within the past 12 months, the food you bought just didn't last and you didn't have money to get more : Never true  Food insecurity resource given?: N/A  Does patient receive dialysis treatments?: No  Does patient have a history of substance abuse?: No  Does patient have a history of Mental Health Diagnosis?: No     Means of Transportation  Means of Transport to Appts[de-identified] Family transport (Patient notes she recently sold her vehicle)  In the past 12 months, has lack of transportation kept you from medical appointments or from getting medications?: No  In the past 12 months, has lack of transportation kept you from meetings, work, or from getting things needed for daily living?: No  Was application for public transport provided?: N/A    DISCHARGE DETAILS:    Discharge planning discussed with[de-identified] PatientBarney 28 of Choice: Yes  Comments - Freedom of Choice: Patient's choice is to return home w/ resumption of care with [facility] Dalia Boyce  CM contacted family/caregiver?: Yes (VM left for sister Damien Polk)  Were Treatment Team discharge recommendations reviewed with patient/caregiver?: Yes  Did patient/caregiver verbalize understanding of patient care needs?: Yes  Were patient/caregiver advised of the risks associated with not following Treatment Team discharge recommendations?: Yes    Contacts  Patient Contacts: Ashly Moncada (sister)  Relationship to Patient[de-identified] Family  Contact Method: Phone  Phone Number: [phone]  Reason/Outcome: Emergency Contact, Discharge Planning    Would you like to participate in our [address] service program?  : No - Declined    Treatment Team Recommendation: Hospice, Home  Discharge Destination Plan[de-identified] Home, Hospice  Transport at Discharge : Family      [facility] [address] [place] [address] confirmed that pt has been on service with their agency for home hospice since 11/2021 for lung cancer dx  [facility] notes that pt will have flares where she will get weak/dehydration, be admitted to the hospital for supportive care, then discharge back home with hospice PEDRO  Gerardo Moreno notes that because patient is admitted to [facility] with a diagnosis unrelated to lung cancer patient does not need to revoke services  Gerardo Moreno requests SW update the office when patient is medically cleared to return home so they can resume care  Patient currently gets 3x week HHA visits and 1x week nursing visits through the [place], [place] office (P: [phone])  SW met bedside with patient to introduce role, complete assessment, and discuss discharge planning needs  Patient confirmed information provided by [facility] and choice to return home with resumption of care  Patient notes she is supported by her sister Karen/brother-in-law [facility] who live four houses down and her best friend that also lives on the same street  Patient's apartment is above a [employer] business  She states she has lived there for many years and that they look after her as well  Daughter Pati Shen lives in [place]  Pt notes when she declines to the point of being unable to live home alone, her plan is to move in with her dtr  Apparently her dtr and son have offered for patient to live with them but she has so far chosen to continue living in her apartment  Patient states that her family will be able to transport her home  SW advised of recommendation by PT for a [place]  Patient confirmed she agrees with this  SW will reach out to [facility] to order for home use  Patient has no further questions or concerns for SW at this time  SW was unable to reach sister Edward Fontaine to review discharge plan  Contact information for SW provided for callback

## 2022-10-06 NOTE — UTILIZATION REVIEW
Initial Clinical Review    Observation 10/5/22 @ [id number] 12 converted to IP admission 10/6/22 @ 1515 for continued care & tx for N&V  Admission: Date/Time/Statement:   Admission Orders (From admission, onward)     Ordered        10/06/22 1515  Inpatient Admission  Once            10/05/22 1743  Place in Observation  Once                      Orders Placed This Encounter   Procedures   Inpatient Admission    Standing Status:   Standing    Number of Occurrences:   1    Order Specific Question:   Level of Care    Answer:   Med Surg [16]    Order Specific Question:   Estimated length of stay    Answer:   More than 2 Midnights    Order Specific Question:   Certification    Answer:   I certify that inpatient services are medically necessary for this patient for a duration of greater than two midnights  See H&P and MD Progress Notes for additional information about the patient's course of treatment  ED Arrival Information     Expected   -    Arrival   10/5/2022 14:25    Acuity   Urgent            Means of arrival   Wheelchair    Escorted by   [facility]    Admission type   Emergency            Arrival complaint   nausea, [facility]           Chief Complaint   Patient presents with   • Vomiting     Hospice pt for lung cancer, discharged from [address] [place] [address] 5 days ago, unable to keep anything down, vomiting, was dx with diverticulitis and c diff     Initial Presentation:  78 yof to ER from home c/o N/V/D past week  Was discharged from [facility] days ago, was diagnosed with C diff and diverticulitis  Has been on 3 antibiotics including Flagyl, Cipro and vanco   Has had difficult tolerating any type of medications liquids or solids  Hx lung Ca  Presents hypertensive, decreased bowel sounds minimal crampy-like tenderness diffusely  Admission work-up showing known diverticulitis on imaging  Placed in observation status      Observation to IP admission 10/6/22:  Double IVABT in progress for diverticulitis, po Vanco for Cdiff  Using  IV Dilaudid for pain & IV Zofran for nausea  Abd distended, guarding, hypoactive BS  Trial advancing diet, IVF maintained at this time  K repletion given  ED Triage Vitals [10/05/22 1437]   Temperature Pulse Respirations Blood Pressure SpO2   98 9 °F (37 2 °C) 69 22 (!) 187/94 97 %      Temp Source Heart Rate Source Patient Position - Orthostatic VS BP Location FiO2 (%)   Tympanic Monitor Sitting Left arm --      Pain Score       7          Wt Readings from Last 1 Encounters:   10/06/22 81 6 kg (180 lb)     Additional Vital Signs:     Pertinent Labs/Diagnostic Test Results:   CT head without contrast   Final Result  (10/05 1658)      Hypodense areas in the left frontal and left occipital lobes which may represent prior infarcts, however, metastatic disease is not excluded in this setting and consider MRI for further evaluation if clinically indicated  Any prior studies would    certainly be helpful in this regard  CT chest abdomen pelvis w contrast   Final Result  (10/05 1656)      Background emphysema with postsurgical changes in the left upper lobe  Complete collapse of the left upper lobe with obstruction of the left upper lobe bronchus for which underlying endobronchial lesion is not excluded  Prior exams would be helpful to    assess for stability  Multiple nodules scattered throughout the right lung the largest in the right lower lobe measuring 5 mm  Continued follow-up suggested  Small to moderate left-sided pleural effusion of uncertain etiology  Colonic diverticulosis with mild wall thickening involving the sigmoid colon which may represent acute or chronic changes related to diverticulitis  No pericolonic inflammatory changes are noted       10/5  Ekg=  Rhythm: sinus rhythm     Ectopy:     Ectopy: none     QRS:     QRS axis:  Normal     QRS intervals:  Normal   Conduction:     Conduction: normal     ST segments:     ST segments:  Non-specific   T waves:     T waves: flattening    Results from last 7 days   Lab Units 10/06/22  0539 10/05/22  1549   WBC Thousand/uL 8 40 9 78   HEMOGLOBIN g/dL 12 8 13 9   HEMATOCRIT % 40 8 42 9   PLATELETS Thousands/uL 287 301   NEUTROS ABS Thousands/µL 6 04 8 38*     Results from last 7 days   Lab Units 10/06/22  0539 10/05/22  1549   SODIUM mmol/L 139 139   POTASSIUM mmol/L 3 3* 3 7   CHLORIDE mmol/L 102 101   CO2 mmol/L 33* 30   ANION GAP mmol/L 4 8   BUN mg/dL 6 9   CREATININE mg/dL 0 80 0 83   EGFR ml/min/1 73sq m 72 69   CALCIUM mg/dL 8 4 8 6   MAGNESIUM mg/dL 1 9 1 5*   PHOSPHORUS mg/dL 2 6  --      Results from last 7 days   Lab Units 10/06/22  0539 10/05/22  1549   AST U/L 22 28   ALT U/L 12 11*   ALK PHOS U/L 56 61   TOTAL PROTEIN g/dL 6 2* 7 2   ALBUMIN g/dL 3 0* 3 4*   TOTAL BILIRUBIN mg/dL 0 36 0 34     Results from last 7 days   Lab Units 10/06/22  0539 10/05/22  1549   GLUCOSE RANDOM mg/dL 110 136     ED Treatment:   Medication Administration from 10/05/2022 1424 to 10/05/2022 2152       Date/Time Order Dose Route Action     10/05/2022 1555 sodium chloride 0 9 % bolus 1,000 mL 1,000 mL Intravenous New Bag     10/05/2022 1544 ondansetron (ZOFRAN) injection 4 mg 4 mg Intravenous Given     10/05/2022 1546 HYDROmorphone (DILAUDID) injection 1 mg 1 mg Intravenous Given     10/05/2022 1630 iohexol (OMNIPAQUE) 350 MG/ML injection (SINGLE-DOSE) 100 mL 100 mL Intravenous Given     10/05/2022 1728 bacitracin topical ointment 1 small application 1 small application Topical Given     10/05/2022 1912 dextrose 5 % and sodium chloride 0 9 % with KCl 20 mEq/L infusion (premix) 50 mL/hr Intravenous New Bag     10/05/2022 2002 vancomycin (VANCOCIN) oral solution 125 mg 125 mg Oral Given     10/05/2022 2002 gabapentin (NEURONTIN) capsule 300 mg 300 mg Oral Given     10/05/2022 2002 acetaminophen (TYLENOL) tablet 650 mg 650 mg Oral Given     10/05/2022 2024 ciprofloxacin (CIPRO) IVPB (premix in 5% dextrose) 400 mg 200 mL 400 mg Intravenous New Bag        Past Medical History:   Diagnosis Date   • C  difficile colitis    • Diverticulitis    • Lung cancer ([facility] [address] )      Admitting Diagnosis: Vomiting [R11 10]  Nausea & vomiting [R11 2]  Clostridium difficile infection [A49 8]  Generalized weakness [R53 1]  Age/Sex: 78 y o  female  Admission Orders:  Contact & droplet isolation  Pt/ot eval & tx  Scd/foot pumps    Scheduled Medications:  ciprofloxacin, 400 mg, Intravenous, Q12H  clotrimazole (MYCELEX) nikhil 10 mg, 5x/day  enoxaparin, 40 mg, Subcutaneous, Daily  FLUoxetine (PROzac) capsule 40 mg, Daily  gabapentin, 300 mg, Oral, BID  levothyroxine, 50 mcg, Oral, Early Morning>increased to 125mcg  metoprolol tartrate (LOPRESSOR) tablet 25 mg, Q12H, start 10/6  metroNIDAZOLE, 500 mg, Intravenous, Q8H  pantoprazole, 40 mg, Oral, Early Morning  potassium chloride (K-DUR,KLOR-CON) CR tablet 40 mEq, Once 10/6  vancomycin, 125 mg, Oral, Q6H YINKA    Continuous IV Infusions:  dextrose 5 % and sodium chloride 0 9 % with KCl 20 mEq/L, 50 mL/hr, Intravenous, Continuous    PRN Meds:  acetaminophen, 650 mg, Oral, Q6H PRN  HYDROmorphone, 0 2 mg, Intravenous, Q3H PRN  HYDROmorphone, 0 5 mg, Intravenous, Q3H PRN, 10/5 x1, 10/6 x1  ondansetron, 4 mg, Intravenous, Q6H PRN, 10/5 x1, 10/6 x1    Network Utilization Review Department  ATTENTION: Please call with any questions or concerns to [phone] and carefully listen to the prompts so that you are directed to the right person  All voicemails are confidential   Candia Siemens all requests for admission clinical reviews, approved or denied determinations and any other requests to dedicated fax number below belonging to the campus where the patient is receiving treatment   List of dedicated fax numbers for the Facilities:  FACILITY NAME UR FAX NUMBER   ADMISSION DENIALS (Administrative/Medical Necessity) [address] [place] [address] (Maternity/NICU/Pediatrics) [phone]     Charito [place] [address] [facility] [phone]   [facility] [phone]   [facility] [phone] [address] [facility] [place] [address] [address] [address] [place] [address] [phone]   [address] [employer] [facility] [place] [address] [phone]

## 2022-10-06 NOTE — PROGRESS NOTES
Linden U  68   Progress Note - Jessica Diop 1945, 78 y o  female MRN: [id number]  Unit/Bed#: [address] [place] [address] Encounter: [id number]  Primary Care Provider: No primary care provider on file  Date and time admitted to hospital: 10/5/2022  2:42 PM    * Bilious vomiting with nausea  Assessment & Plan  Hst of lung cancer on hospice with recent diagnosis of C Diff Colitis  Discharged home from [facility] on Saturday  Since then she has been nauseous with vomiting and diarrhea  She has been unable to eat or drink  Now with weakness and falls  · Change antibiotics from PO to IV while hospitalized   · Continue gentle IVF with D5 NS + 20 mEq KCL at 50 mL/hr for 20 hours   · Advance diet   · Supportive care with IV Zofran     Clostridium difficile colitis  Assessment & Plan  Admitted to [facility] 9/27/22 -10/1/22 with C Diff Colitis  Sent home on PO Ciprofloxacin, Flagyl, and Vancomycin pulse taper  This is her second bout of Vancomycin  Updated CT A/P: "Colonic diverticulosis with mild wall thickening involving the sigmoid colon which may represent acute or chronic changes related to diverticulitis  No pericolonic inflammatory changes are noted "  · Continue IV Ciprofloxacin and Flagyl   · Continue PO Vancomycin  · Recheck C Diff   · Hydration  · Advance from clear liquids to regular diet     Hypomagnesemia  Assessment & Plan  Mag 1 5, likely due to N/V/D   · Repleted with Mag Sulfate 2 gm IV x1 and Mag improved to 1 9  · Monitor     Oral thrush  Assessment & Plan  · Mycelex troches ordered 5x/day     Pleural effusion  Assessment & Plan  CT shows small to moderate left-sided pleural effusion   Occasionally uses home O2 at 1-2 LPM  Currently on 1 5 LPM with oxygen saturations in the mid 90s  · Continual supplemental oxygen  · Caution with IVF    Lung cancer [facility])  Assessment & Plan  On hospice with [facility]  Sent in by hospice nurse due to above     · Discussed abnormalities noted on CT scan; patient does not want aggressive workup/ management     Generalized weakness  Assessment & Plan  With falls 2/2 to nausea, vomiting, and diarrhea  Unable to eat or drink for several days  · Supportive care as above  · PT/OT eval      VTE Pharmacologic Prophylaxis: VTE Score: 3 Moderate Risk (Score 3-4) - Pharmacological DVT Prophylaxis Ordered: enoxaparin (Lovenox)  Patient Centered Rounds: I performed bedside rounds with nursing staff today  Discussions with Specialists or Other Care Team Provider: nursing, CM    Education and Discussions with Family / Patient: Updated  (daughter) via phone  Time Spent for Care: 30 minutes  More than 50% of total time spent on counseling and coordination of care as described above  Current Length of Stay: 0 day(s)  Current Patient Status: Inpatient   Certification Statement: The patient will continue to require additional inpatient hospital stay due to nausea, vomiting, poor oral intake  Discharge Plan: Anticipate discharge in 24-48 hrs to home  Code Status: Level 3 - DNAR and DNI    Subjective:   Patient seen and examined at bedside  Reports oral thrush  Reports ongoing nausea  Reports ongoing abdominal pain  Denies CP  Feels like her breathing is at baseline  Objective:     Vitals:   Temp (24hrs), Av 6 °F (36 4 °C), Min:97 2 °F (36 2 °C), Max:97 8 °F (36 6 °C)    Temp:  [97 2 °F (36 2 °C)-97 8 °F (36 6 °C)] 97 7 °F (36 5 °C)  HR:  [64-79] 78  Resp:  [12-20] 17  BP: (118-160)/(56-81) 136/75  SpO2:  [91 %-99 %] 99 %  Body mass index is 31 89 kg/m²  Input and Output Summary (last 24 hours): Intake/Output Summary (Last 24 hours) at 10/6/2022 1544  Last data filed at 10/5/2022 1911  Gross per 24 hour   Intake 1000 ml   Output --   Net 1000 ml       Physical Exam:   Physical Exam  Vitals and nursing note reviewed  Constitutional:       General: She is not in acute distress  Appearance: She is obese   She is ill-appearing and toxic-appearing  She is not diaphoretic  Comments: Pleasant female resting in bed on 1 5 L NC    HENT:      Head: Normocephalic  Mouth/Throat:      Mouth: Mucous membranes are moist       Comments: Oral thrush   Eyes:      Conjunctiva/sclera: Conjunctivae normal    Cardiovascular:      Rate and Rhythm: Normal rate  Pulmonary:      Effort: Pulmonary effort is normal  No tachypnea  Breath sounds: Examination of the right-lower field reveals decreased breath sounds  Examination of the left-lower field reveals decreased breath sounds  Decreased breath sounds present  No wheezing, rhonchi or rales  Abdominal:      General: Bowel sounds are normal  There is no distension  Palpations: Abdomen is soft  Tenderness: There is abdominal tenderness  There is no guarding or rebound  Musculoskeletal:         General: Normal range of motion  Cervical back: Normal range of motion  Right lower leg: No edema  Left lower leg: No edema  Skin:     General: Skin is warm and dry  Capillary Refill: Capillary refill takes less than 2 seconds  Neurological:      Mental Status: She is alert and oriented to person, place, and time  Mental status is at baseline  Motor: Weakness present  Psychiatric:         Mood and Affect: Mood normal          Behavior: Behavior normal          Thought Content:  Thought content normal          Judgment: Judgment normal           Additional Data:     Labs:  Results from last 7 days   Lab Units 10/06/22  0539   WBC Thousand/uL 8 40   HEMOGLOBIN g/dL 12 8   HEMATOCRIT % 40 8   PLATELETS Thousands/uL 287   NEUTROS PCT % 72   LYMPHS PCT % 16   MONOS PCT % 9   EOS PCT % 2     Results from last 7 days   Lab Units 10/06/22  0539   SODIUM mmol/L 139   POTASSIUM mmol/L 3 3*   CHLORIDE mmol/L 102   CO2 mmol/L 33*   BUN mg/dL 6   CREATININE mg/dL 0 80   ANION GAP mmol/L 4   CALCIUM mg/dL 8 4   ALBUMIN g/dL 3 0*   TOTAL BILIRUBIN mg/dL 0 36   ALK PHOS U/L 56   ALT U/L 12   AST U/L 22   GLUCOSE RANDOM mg/dL 110                       Lines/Drains:  Invasive Devices  Report    Central Venous Catheter Line  Duration           Port A Cath Right Chest -- days                Central Line:  Goal for removal: Chronic power port              Imaging: Reviewed radiology reports from this admission including: chest CT scan, abdominal/pelvic CT and CT head    Recent Cultures (last 7 days):         Last 24 Hours Medication List:   Current Facility-Administered Medications   Medication Dose Route Frequency Provider Last Rate   • acetaminophen  650 mg Oral Q6H PRN SHERRY Cutler     • ALPRAZolam  0 5 mg Oral TID PRN SHERRY Cutler     • ciprofloxacin  400 mg Intravenous Q12H SHERRY Cutler 400 mg (10/06/22 3138)   • clotrimazole  10 mg Oral 5x Daily SHERRY Cutler     • dextrose 5 % and sodium chloride 0 9 % with KCl 20 mEq/L  50 mL/hr Intravenous Continuous SHERRY Cutler     • enoxaparin  40 mg Subcutaneous Daily SHERRY Cutler     • FLUoxetine  40 mg Oral Daily SHERRY Cutler     • gabapentin  300 mg Oral BID SHERRY Cutler     • HYDROmorphone  0 2 mg Intravenous Q3H PRN SHERRY Cutler     • HYDROmorphone  0 5 mg Intravenous Q3H PRN SHERRY Cutler     • [START ON 10/7/2022] levothyroxine  125 mcg Oral Early Morning SHERRY Cutler     • metoclopramide  10 mg Intravenous Q6H PRN SHERRY Cutler     • metoprolol tartrate  25 mg Oral Q12H [address] SHERRY Cutler     • metroNIDAZOLE  500 mg Intravenous Q8H SHERRY Cutler 500 mg (10/06/22 1402)   • ondansetron  4 mg Intravenous Q6H PRN SHERRY Cutler     • pantoprazole  40 mg Oral Early Morning SHERRY Cutler     • vancomycin  125 mg Oral Q6H [address] SHERRY [place]          Today, Patient Was Seen By: Link [place] Martina    **Please Note: This note may have been constructed using a voice recognition system  **

## 2022-10-06 NOTE — ASSESSMENT & PLAN NOTE
Hst of lung cancer on hospice with recent diagnosis of C Diff Colitis  Discharged home from [facility] on Saturday  Since then she has been nauseous with vomiting and diarrhea  She has been unable to eat or drink  Now with weakness and falls     · Change antibiotics from PO to IV while hospitalized   · Continue gentle IVF with D5 NS + 20 mEq KCL at 50 mL/hr for 20 hours   · Advance diet   · Supportive care with IV Zofran

## 2022-10-06 NOTE — ASSESSMENT & PLAN NOTE
On hospice with [facility]  Sent in by hospice nurse due to above     · Discussed abnormalities noted on CT scan; patient does not want aggressive workup/ management

## 2022-10-06 NOTE — ASSESSMENT & PLAN NOTE
Mag 1 5, likely due to N/V/D   · Repleted with Mag Sulfate 2 gm IV x1 and Mag improved to 1 9  · Monitor

## 2022-10-06 NOTE — ASSESSMENT & PLAN NOTE
CT shows small to moderate left-sided pleural effusion   Occasionally uses home O2 at 1-2 LPM  Currently on 1 5 LPM with oxygen saturations in the mid 90s     · Continual supplemental oxygen  · Caution with IVF

## 2022-10-06 NOTE — PHYSICAL THERAPY NOTE
PT EVALUATION     10/06/22 0913   Note Type   Note type Evaluation   Pain Assessment   Pain Score No Pain   Restrictions/Precautions   Weight Bearing Precautions Per Order No   Other Precautions Contact/isolation;Droplet precautions; Chair Alarm; Bed Alarm;Multiple lines;O2;Fall Risk   Home Living   Type of Home House  (Second floor of a farmhouse)   Home Layout One level;Stairs to enter with rails  (1 flight to enter)   [address] [place] [address] [address]   Home Equipment   (Pt states she may be getting a walker from hospice services )   Prior Function   Level of [place] Independent with ADLs and functional mobility; Needs assistance with IADLs   Lives With Alone   Receives Help From Family;Friend(s); Home health  (home health aide 3x/wk; hospice nurse 1x/wk; best friend lives 1 min away; hires someone to clean her house)   ADL Assistance Independent   IADLs Needs assistance   Falls in the last 6 months 1 to 4  (Fall last night which was the first fall she could not get up from independently, she has had other falls recently where she was able to rise up on her own)   Vocational Retired   General   Additional Pertinent History Pt is currently on home hospice for her lung cancer  She was at home 1 day from [facility] when she fell and was unable to get up  Pt had to call her best friend to take her to the hospital where she is currently being treated for nausea and vomitting     Family/Caregiver Present No   Cognition   Overall Cognitive Status WFL   Arousal/Participation Cooperative   Orientation Level Oriented X4   Memory Within functional limits   Following Commands Follows all commands and directions without difficulty   Subjective   Subjective "I don't feel any better since I've come to the hospital, I'm still very nauseous" "My legs felt like mud, I couldn't get up by myself after I fell I was so weak"   RLE Assessment   RLE Assessment WFL   LLE Assessment   LLE Assessment WFL   Bed Mobility   Rolling R 5  Supervision   Additional items Bedrails   Supine to Sit 5  Supervision   Additional items HOB elevated; Bedrails   Sit to Supine 5  Supervision   Additional items HOB elevated; Bedrails   Transfers   Sit to Stand 4  Minimal assistance   Additional items Assist x 1  (from EOB)   Stand to Sit 5  Supervision  (to EOB)   Ambulation/Elevation   Gait pattern Forward Flexion;Decreased foot clearance; Short stride   Gait Assistance 5  Supervision   Assistive Device Rolling walker   Distance 75ft  (with change in direction)   Stair Management Assistance Not tested   Balance   Static Sitting Fair   Static Standing Fair   Ambulatory Fair   Endurance Deficit   Endurance Deficit Yes   Endurance Deficit Description Secondary to generalized weakness  Per attending, pt has not eaten in 4 days  Activity Tolerance   Activity Tolerance Patient limited by fatigue   Medical Staff Made Aware Yes   Nurse Made Aware Yes, RN Fauzia Zhang  (Gave pt nausea meds prior to starting session )   Assessment   Prognosis Good   Problem List Decreased strength;Decreased range of motion;Decreased endurance; Impaired balance;Decreased mobility;Pain   Assessment Patient seen for Physical Therapy evaluation  Patient admitted with Bilious vomiting with nausea  Comorbidities affecting patient's physical performance include: lung cancer, clostridium difficile colitis, generalized weakness, pleural effusion  Personal factors affecting patient at time of initial evaluation include: stairs to enter home, positive fall history and inability to perform IADLS   Prior to admission, patient was independent with functional mobility without assistive device, independent with ADLS, requiring assist for IADLS and ambulating household distance    Please find objective findings from Physical Therapy assessment regarding body systems outlined above with impairments and limitations including weakness, decreased ROM, impaired balance, decreased endurance, impaired coordination, gait deviations, pain, decreased activity tolerance, decreased functional mobility tolerance and fall risk  The Barthel Index was used as a functional outcome tool presenting with a score of Barthel Index Score: 65 today indicating moderate limitations of functional mobility and ADLS  Patient's clinical presentation is currently unstable/unpredictable as seen in patient's presentation of increased fall risk, new onset of impairment of functional mobility and new onset of weakness  Pt would benefit from continued Physical Therapy treatment to address deficits as defined above and maximize level of functional mobility  As demonstrated by objective findings, the assigned level of complexity for this evaluation is high  The patient's AM-[facility] Basic Mobility Inpatient Short Form Raw Score is 18  A Raw score of greater than 16 suggests the patient may benefit from discharge to home  Goals   Patient Goals To go home   STG Expiration Date 10/13/22   Short Term Goal #1 Independent transfers; independent to ambulate 100ft with RW on level surfaces in order to safely navigate her home; independent to ascend/descend 5 steps with 1 rail in a reciprocal pattern in order to enter/exit her house   LTG Expiration Date 10/20/22   Long Term Goal #1 Independent to ambulate 200ft with RW on level surfaces in order to safely navigate her home; independent to ascend/descend a flight of steps with 1 rail in a reciprocal pattern in order to enter/exit her house   Plan   Treatment/Interventions ADL retraining;Functional transfer training;LE strengthening/ROM; Elevations; Therapeutic exercise; Endurance training;Patient/family training;Equipment eval/education; Bed mobility;Gait training   PT Frequency   (5x/wk)   Recommendation   PT Discharge Recommendation Home with home health rehabilitation    Equipment Recommended [facility] Recommended Wheeled walker   Change/add to [place]? No   Additional Comments Pt may be receiving RW from hospice services  AM-PAC Basic Mobility Inpatient   Turning in Bed Without Bedrails 3   Lying on Back to Sitting on Edge of Flat Bed 3   Moving Bed to Chair 3   Standing Up From Chair 3   Walk in Room 3   Climb 3-5 Stairs 3   Basic Mobility Inpatient Raw Score 18   Basic Mobility Standardized Score 41 05   Highest Level Of Mobility   JH-HLM Goal 6: Walk 10 steps or more   JH-HLM Achieved 7: Walk 25 feet or more   Modified [place] Scale   Modified [place] Scale 1   Barthel Index   Feeding 10   Bathing 5   Grooming Score 5   Dressing Score 5   Bladder Score 10   Bowels Score 10   Toilet Use Score 5   Transfers (Bed/Chair) Score 10   Mobility (Level Surface) Score 0   Stairs Score 5   Barthel Index Score 65   End of Consult   Patient Position at End of Consult Supine;Bed/Chair alarm activated; All needs within reach   Licensure   [address] Number  Libia Lev SPT

## 2022-10-06 NOTE — PLAN OF CARE
Problem: Nutrition/Hydration-ADULT  Goal: Nutrient/Hydration intake appropriate for improving, restoring or maintaining nutritional needs  Description: Monitor and assess patient's nutrition/hydration status for malnutrition  Collaborate with interdisciplinary team and initiate plan and interventions as ordered  Monitor patient's weight and dietary intake as ordered or per policy  Utilize nutrition screening tool and intervene as necessary  Determine patient's food preferences and provide high-protein, high-caloric foods as appropriate       INTERVENTIONS:  - Monitor oral intake, urinary output, labs, and treatment plans  - Assess nutrition and hydration status and recommend course of action  - Evaluate amount of meals eaten  - Assist patient with eating if necessary   - Allow adequate time for meals  - Recommend/ encourage appropriate diets, oral nutritional supplements, and vitamin/mineral supplements  - Order, calculate, and assess calorie counts as needed  - Recommend, monitor, and adjust tube feedings and TPN/PPN based on assessed needs  - Assess need for intravenous fluids  - Provide specific nutrition/hydration education as appropriate  - Include patient/family/caregiver in decisions related to nutrition  Outcome: Progressing     Problem: Prexisting or High Potential for Compromised Skin Integrity  Goal: Skin integrity is maintained or improved  Description: INTERVENTIONS:  - Identify patients at risk for skin breakdown  - Assess and monitor skin integrity  - Assess and monitor nutrition and hydration status  - Monitor labs   - Assess for incontinence   - Turn and reposition patient  - Assist with mobility/ambulation  - Relieve pressure over bony prominences  - Avoid friction and shearing  - Provide appropriate hygiene as needed including keeping skin clean and dry  - Evaluate need for skin moisturizer/barrier cream  - Collaborate with interdisciplinary team   - Patient/family teaching  - Consider wound care consult   Outcome: Progressing     Problem: MOBILITY - ADULT  Goal: Maintain or return to baseline ADL function  Description: INTERVENTIONS:  -  Assess patient's ability to carry out ADLs; assess patient's baseline for ADL function and identify physical deficits which impact ability to perform ADLs (bathing, care of mouth/teeth, toileting, grooming, dressing, etc )  - Assess/evaluate cause of self-care deficits   - Assess range of motion  - Assess patient's mobility; develop plan if impaired  - Assess patient's need for assistive devices and provide as appropriate  - Encourage maximum independence but intervene and supervise when necessary  - Involve family in performance of ADLs  - Assess for home care needs following discharge   - Consider OT consult to assist with ADL evaluation and planning for discharge  - Provide patient education as appropriate  Outcome: Progressing  Goal: Maintains/Returns to pre admission functional level  Description: INTERVENTIONS:  - Perform BMAT or MOVE assessment daily    - Set and communicate daily mobility goal to care team and patient/family/caregiver  - Collaborate with rehabilitation services on mobility goals if consulted  - Perform Range of Motion 2 times a day    - Out of bed for toileting  - Record patient progress and toleration of activity level   Outcome: Progressing     Problem: Potential for Falls  Goal: Patient will remain free of falls  Description: INTERVENTIONS:  - Educate patient/family on patient safety including physical limitations  - Instruct patient to call for assistance with activity   - Consult OT/PT to assist with strengthening/mobility   - Keep Call bell within reach  - Keep bed low and locked with side rails adjusted as appropriate  - Keep care items and personal belongings within reach  - Initiate and maintain comfort rounds  - Make Fall Risk Sign visible to staff  - Offer Toileting every 2 Hours, in advance of need  - Initiate/Maintain bed alarm  - Apply yellow socks and bracelet for high fall risk patients  - Consider moving patient to room near nurses station  Outcome: Progressing

## 2022-10-07 PROBLEM — B37.9 YEAST INFECTION: Status: ACTIVE | Noted: 2022-10-07

## 2022-10-07 PROBLEM — E83.39 HYPOPHOSPHATEMIA: Status: ACTIVE | Noted: 2022-10-07

## 2022-10-07 LAB
ANION GAP SERPL CALCULATED.3IONS-SCNC: 4 MMOL/L (ref 4–13)
ATRIAL RATE: 73 BPM
BUN SERPL-MCNC: 4 MG/DL (ref 5–25)
CALCIUM SERPL-MCNC: 8 MG/DL (ref 8.3–10.1)
CHLORIDE SERPL-SCNC: 103 MMOL/L (ref 96–108)
CO2 SERPL-SCNC: 31 MMOL/L (ref 21–32)
CREAT SERPL-MCNC: 0.79 MG/DL (ref 0.6–1.3)
GFR SERPL CREATININE-BSD FRML MDRD: 73 ML/MIN/1.73SQ M
GLUCOSE SERPL-MCNC: 111 MG/DL (ref 65–140)
MAGNESIUM SERPL-MCNC: 1.6 MG/DL (ref 1.6–2.6)
P AXIS: 16 DEGREES
PHOSPHATE SERPL-MCNC: 2.2 MG/DL (ref 2.3–4.1)
POTASSIUM SERPL-SCNC: 3.6 MMOL/L (ref 3.5–5.3)
PR INTERVAL: 178 MS
QRS AXIS: -4 DEGREES
QRSD INTERVAL: 84 MS
QT INTERVAL: 438 MS
QTC INTERVAL: 482 MS
SODIUM SERPL-SCNC: 138 MMOL/L (ref 135–147)
T WAVE AXIS: 19 DEGREES
VENTRICULAR RATE: 73 BPM

## 2022-10-07 PROCEDURE — 97530 THERAPEUTIC ACTIVITIES: CPT

## 2022-10-07 PROCEDURE — 83735 ASSAY OF MAGNESIUM: CPT | Performed by: NURSE PRACTITIONER

## 2022-10-07 PROCEDURE — 93010 ELECTROCARDIOGRAM REPORT: CPT | Performed by: INTERNAL MEDICINE

## 2022-10-07 PROCEDURE — 84100 ASSAY OF PHOSPHORUS: CPT | Performed by: NURSE PRACTITIONER

## 2022-10-07 PROCEDURE — 80048 BASIC METABOLIC PNL TOTAL CA: CPT | Performed by: NURSE PRACTITIONER

## 2022-10-07 PROCEDURE — 99232 SBSQ HOSP IP/OBS MODERATE 35: CPT | Performed by: NURSE PRACTITIONER

## 2022-10-07 RX ORDER — BUTALBITAL, ACETAMINOPHEN AND CAFFEINE 50; 325; 40 MG/1; MG/1; MG/1
1 TABLET ORAL ONCE
Status: DISCONTINUED | OUTPATIENT
Start: 2022-10-07 | End: 2022-10-09 | Stop reason: HOSPADM

## 2022-10-07 RX ORDER — PROMETHAZINE HYDROCHLORIDE 25 MG/ML
12.5 INJECTION, SOLUTION INTRAMUSCULAR; INTRAVENOUS EVERY 6 HOURS PRN
Status: DISCONTINUED | OUTPATIENT
Start: 2022-10-07 | End: 2022-10-07

## 2022-10-07 RX ORDER — PROMETHAZINE HYDROCHLORIDE 25 MG/1
12.5 TABLET ORAL EVERY 6 HOURS PRN
Status: DISCONTINUED | OUTPATIENT
Start: 2022-10-07 | End: 2022-10-09 | Stop reason: HOSPADM

## 2022-10-07 RX ORDER — MAGNESIUM SULFATE HEPTAHYDRATE 40 MG/ML
2 INJECTION, SOLUTION INTRAVENOUS ONCE
Status: COMPLETED | OUTPATIENT
Start: 2022-10-07 | End: 2022-10-07

## 2022-10-07 RX ORDER — SCOLOPAMINE TRANSDERMAL SYSTEM 1 MG/1
1 PATCH, EXTENDED RELEASE TRANSDERMAL
Status: DISCONTINUED | OUTPATIENT
Start: 2022-10-07 | End: 2022-10-09 | Stop reason: HOSPADM

## 2022-10-07 RX ORDER — FLUCONAZOLE 100 MG/1
100 TABLET ORAL ONCE
Status: COMPLETED | OUTPATIENT
Start: 2022-10-07 | End: 2022-10-07

## 2022-10-07 RX ORDER — NYSTATIN 100000 [USP'U]/G
POWDER TOPICAL 2 TIMES DAILY
Status: DISCONTINUED | OUTPATIENT
Start: 2022-10-07 | End: 2022-10-09 | Stop reason: HOSPADM

## 2022-10-07 RX ADMIN — NYSTATIN: 100000 POWDER TOPICAL at 18:01

## 2022-10-07 RX ADMIN — NYSTATIN 500000 UNITS: 100000 SUSPENSION ORAL at 18:00

## 2022-10-07 RX ADMIN — GABAPENTIN 300 MG: 300 CAPSULE ORAL at 08:08

## 2022-10-07 RX ADMIN — FLUOXETINE HYDROCHLORIDE 40 MG: 20 CAPSULE ORAL at 08:08

## 2022-10-07 RX ADMIN — CIPROFLOXACIN 400 MG: 2 INJECTION, SOLUTION INTRAVENOUS at 18:01

## 2022-10-07 RX ADMIN — MAGNESIUM SULFATE HEPTAHYDRATE 2 G: 40 INJECTION, SOLUTION INTRAVENOUS at 10:04

## 2022-10-07 RX ADMIN — CIPROFLOXACIN 400 MG: 2 INJECTION, SOLUTION INTRAVENOUS at 08:07

## 2022-10-07 RX ADMIN — METRONIDAZOLE 500 MG: 500 SOLUTION INTRAVENOUS at 21:25

## 2022-10-07 RX ADMIN — METOPROLOL TARTRATE 25 MG: 25 TABLET, FILM COATED ORAL at 08:08

## 2022-10-07 RX ADMIN — FLUCONAZOLE 100 MG: 100 TABLET ORAL at 11:58

## 2022-10-07 RX ADMIN — ONDANSETRON 4 MG: 2 INJECTION INTRAMUSCULAR; INTRAVENOUS at 13:50

## 2022-10-07 RX ADMIN — GABAPENTIN 300 MG: 300 CAPSULE ORAL at 18:01

## 2022-10-07 RX ADMIN — ENOXAPARIN SODIUM 40 MG: 40 INJECTION SUBCUTANEOUS at 08:08

## 2022-10-07 RX ADMIN — ALPRAZOLAM 0.5 MG: 0.5 TABLET ORAL at 05:57

## 2022-10-07 RX ADMIN — LEVOTHYROXINE SODIUM 125 MCG: 125 TABLET ORAL at 05:10

## 2022-10-07 RX ADMIN — METRONIDAZOLE 500 MG: 500 SOLUTION INTRAVENOUS at 05:11

## 2022-10-07 RX ADMIN — PANTOPRAZOLE SODIUM 40 MG: 40 TABLET, DELAYED RELEASE ORAL at 05:11

## 2022-10-07 RX ADMIN — NYSTATIN 500000 UNITS: 100000 SUSPENSION ORAL at 21:31

## 2022-10-07 RX ADMIN — Medication 125 MG: at 23:37

## 2022-10-07 RX ADMIN — HYDROMORPHONE HYDROCHLORIDE 0.5 MG: 1 INJECTION, SOLUTION INTRAMUSCULAR; INTRAVENOUS; SUBCUTANEOUS at 15:05

## 2022-10-07 RX ADMIN — Medication 125 MG: at 00:57

## 2022-10-07 RX ADMIN — Medication 125 MG: at 11:58

## 2022-10-07 RX ADMIN — METRONIDAZOLE 500 MG: 500 SOLUTION INTRAVENOUS at 14:59

## 2022-10-07 RX ADMIN — HYDROMORPHONE HYDROCHLORIDE 0.5 MG: 1 INJECTION, SOLUTION INTRAMUSCULAR; INTRAVENOUS; SUBCUTANEOUS at 18:00

## 2022-10-07 RX ADMIN — CLOTRIMAZOLE 10 MG: 10 LOZENGE ORAL at 08:08

## 2022-10-07 RX ADMIN — NYSTATIN: 100000 POWDER TOPICAL at 11:58

## 2022-10-07 RX ADMIN — POTASSIUM PHOSPHATE, MONOBASIC AND POTASSIUM PHOSPHATE, DIBASIC 30 MMOL: 224; 236 INJECTION, SOLUTION, CONCENTRATE INTRAVENOUS at 11:58

## 2022-10-07 RX ADMIN — Medication 125 MG: at 18:01

## 2022-10-07 RX ADMIN — HYDROMORPHONE HYDROCHLORIDE 0.5 MG: 1 INJECTION, SOLUTION INTRAMUSCULAR; INTRAVENOUS; SUBCUTANEOUS at 21:21

## 2022-10-07 RX ADMIN — Medication 125 MG: at 05:11

## 2022-10-07 RX ADMIN — ONDANSETRON 4 MG: 2 INJECTION INTRAMUSCULAR; INTRAVENOUS at 08:08

## 2022-10-07 RX ADMIN — NYSTATIN 500000 UNITS: 100000 SUSPENSION ORAL at 11:58

## 2022-10-07 RX ADMIN — METOPROLOL TARTRATE 25 MG: 25 TABLET, FILM COATED ORAL at 21:31

## 2022-10-07 RX ADMIN — SCOPALAMINE 1 PATCH: 1 PATCH, EXTENDED RELEASE TRANSDERMAL at 16:19

## 2022-10-07 NOTE — CASE MANAGEMENT
Case Management Discharge Planning Note    Patient name Yisel Washington  Location 18 [facility] 222/2 [address] 222 MRN [id number]  : 1945 Date 10/7/2022       Current Admission Date: 10/5/2022  Current Admission Diagnosis:Bilious vomiting with nausea   Patient Active Problem List    Diagnosis Date Noted   • Oral thrush 10/06/2022   • Hypomagnesemia 10/05/2022   • Clostridium difficile colitis 10/05/2022   • Bilious vomiting with nausea 10/05/2022   • Generalized weakness 10/05/2022   • Lung cancer ([facility] [address] ) 10/05/2022   • Pleural effusion 10/05/2022      LOS (days): 1  Geometric Mean LOS (GMLOS) (days): 2 60  Days to GMLOS:1 6     OBJECTIVE:  Risk of Unplanned Readmission Score: 14 73         Current admission status: Inpatient   Preferred Pharmacy:   [address] [place] [address]  Phone: [phone] Fax: [phone]    Primary Care Provider: No primary care provider on file  Primary Insurance: [facility]  Secondary Insurance:     DISCHARGE DETAILS:    Discharge planning discussed with[de-identified] Patient, [facility]    Treatment Team Recommendation: Hospice, Home  Discharge Destination Plan[de-identified] Hospice, Home  Transport at Discharge : Family     IMM Given (Date):: 10/07/22  IMM Given to[de-identified] Patient (IMM#2 reviewed with patient  Patient gave verbal understanding, signed, and was provided original  Copy placed in scan bin )     Per SLIM AP, patient is planned for discharge home in 24-48hrs pending symptom improvement  Patient states she will have more support available for a weekend discharge as well  SW received call from [address] [facility] [address] ([phone])  SW advised if discussion held with patient yesterday and current discharge plan  Siri Perez advised that patient's only biological relatives are her dtr and son that live in [place]  Her "sisters" are actually close friends   ARTIS advised of recommendation by PT for Jung Ortiz confirmed she is in full agreement with that recommendation and will order one for patient's home  Weekend SW to contact [address] [place] [address] office (P: [phone]) to advise of weekend discharge if patient is medically cleared to leave

## 2022-10-07 NOTE — ASSESSMENT & PLAN NOTE
With falls 2/2 to nausea, vomiting, and diarrhea  Unable to eat or drink for several days    · Supportive care as above  · PT/OT eval; recommend HHS  · Patient will be staying with a friend on discharge

## 2022-10-07 NOTE — ASSESSMENT & PLAN NOTE
On hospice with [facility]  Sent in by hospice nurse due to above     · Previous provider discussed abnormalities noted on CT scan; patient does not want aggressive workup/ management

## 2022-10-07 NOTE — ASSESSMENT & PLAN NOTE
CT shows small to moderate left-sided pleural effusion   Occasionally uses home O2 at 1-2 LPM  Currently on 1 5 LPM with oxygen saturations in the mid 90s     · Continual supplemental oxygen PRN   · Currently on room air sating 93-94%

## 2022-10-07 NOTE — ASSESSMENT & PLAN NOTE
Admitted to [facility] 9/27/22 -10/1/22 with C Diff Colitis  Sent home on PO Ciprofloxacin, Flagyl, and Vancomycin pulse taper  This is her second bout of Vancomycin  Updated CT A/P: "Colonic diverticulosis with mild wall thickening involving the sigmoid colon which may represent acute or chronic changes related to diverticulitis    No pericolonic inflammatory changes are noted "  · Continue IV Ciprofloxacin and Flagyl through 10/8/22  · Continue prolonged PO Vancomycin taper   · Can resume her prescription from home on discharge  · Advance from clear liquids to regular diet as tolerated; patient continues to report anorexia and nausea  · Optimize electrolytes

## 2022-10-07 NOTE — PHYSICAL THERAPY NOTE
PT TREATMENT     10/07/22 6350   Note Type   Note Type Treatment   Pain Assessment   Pain Assessment Tool 0-10   Pain Score No Pain   Restrictions/Precautions   Other Precautions Contact/isolation; Fall Risk;O2   General   Chart Reviewed Yes   Cognition   Overall Cognitive Status WFL   Subjective   Subjective Pt reports not feeling any better since admission nor being able to eat any food  Bed Mobility   Supine to Sit 5  Supervision   Additional items Bedrails   Sit to Supine 5  Supervision   Additional items Bedrails   Transfers   Sit to Stand 5  Supervision from chair and toilet, min assist to manage clothing   Stand to Sit 5  Supervision   Stand pivot 5  Supervision   Ambulation/Elevation   Gait Assistance 5  Supervision   Assistive Device Rolling walker   Distance 2x75 feet   Balance   Static Sitting Good   Static Standing Good   Endurance Deficit   Endurance Deficit   (Sp02 > 90% on RA)   Activity Tolerance   Activity Tolerance Patient limited by fatigue; Pt limited by nausea  After treatment session pt put her call light on asking for meds for nausea  BELLO Boswell notified  Assessment   Prognosis Good   Problem List Decreased strength;Decreased endurance; Impaired balance;Decreased mobility   Assessment Pt demonstrates fair activity tolerance and ability to ambulate functional household distances with a walker despite c/o persistent nausea  Pt demonstrates enough mobility to go home with home PTand family support upon DC  The patient's AM-PAC Basic Mobility Inpatient Short Form Raw Score is 18  A Raw score of greater than 16 suggests the patient may benefit from discharge to home  Plan   Treatment/Interventions Functional transfer training;LE strengthening/ROM; Therapeutic exercise;Gait training;Bed mobility; Equipment eval/education;Patient/family training; Endurance training;Elevations   Progress Progressing toward goals   PT Frequency Other (Comment)  (5x/week)   Recommendation   PT Discharge Recommendation Home with home health rehabilitation   [address] Mobility Inpatient   Turning in Bed Without Bedrails 3   Lying on Back to Sitting on Edge of Flat Bed 3   Moving Bed to Chair 3   Standing Up From Chair 3   Walk in Room 3   Climb 3-5 Stairs 3   Basic Mobility Inpatient Raw Score 18   Basic Mobility Standardized Score 41 05   Highest Level Of Mobility   JH-HLM Goal 6: Walk 10 steps or more   JH-HLM Achieved 7: Walk 25 feet or more   End of Consult   Patient Position at End of Consult Supine; All needs within reach   [facility] License Number  Manoj Muir PT  [id number]

## 2022-10-07 NOTE — ASSESSMENT & PLAN NOTE
Mag 1 5 -> 1 6, likely due to N/V/D   · Improved to 1 8 after repletion  · Repeat magnesium in a m   As poor oral intake continues

## 2022-10-07 NOTE — UTILIZATION REVIEW
Continued Stay Review    Date:   10/7                          Current Patient Class:   Inpatient  Current Level of Care:    Med surg    HPI:77 y o  female initially admitted on Observation 10/5/22 @ [id number] 84 12 converted to IP admission 10/6/22 @ 1515 for continued care & tx for N&V          Assessment/Plan:   10/7   Continue  Antiemetics as needed  Remains on  GO  Monitor labs  Continue PT/OT  Plan home with hospice care  Continue  Current meds/treatment plan  Advanced to diet as tolerated      Vital Signs:    98 6-69-17     122/47    Pertinent Labs/Diagnostic Results:       Results from last 7 days   Lab Units 10/06/22  0539 10/05/22  1549   WBC Thousand/uL 8 40 9 78   HEMOGLOBIN g/dL 12 8 13 9   HEMATOCRIT % 40 8 42 9   PLATELETS Thousands/uL 287 301   NEUTROS ABS Thousands/µL 6 04 8 38*         Results from last 7 days   Lab Units 10/07/22  0516 10/06/22  0539 10/05/22  1549   SODIUM mmol/L 138 139 139   POTASSIUM mmol/L 3 6 3 3* 3 7   CHLORIDE mmol/L 103 102 101   CO2 mmol/L 31 33* 30   ANION GAP mmol/L 4 4 8   BUN mg/dL 4* 6 9   CREATININE mg/dL 0 79 0 80 0 83   EGFR ml/min/1 73sq m 73 72 69   CALCIUM mg/dL 8 0* 8 4 8 6   MAGNESIUM mg/dL 1 6 1 9 1 5*   PHOSPHORUS mg/dL 2 2* 2 6  --      Results from last 7 days   Lab Units 10/06/22  0539 10/05/22  1549   AST U/L 22 28   ALT U/L 12 11*   ALK PHOS U/L 56 61   TOTAL PROTEIN g/dL 6 2* 7 2   ALBUMIN g/dL 3 0* 3 4*   TOTAL BILIRUBIN mg/dL 0 36 0 34         Results from last 7 days   Lab Units 10/07/22  0516 10/06/22  0539 10/05/22  1549   GLUCOSE RANDOM mg/dL 111 110 136                   Medications:   Scheduled Medications:  butalbital-acetaminophen-caffeine, 1 tablet, Oral, Once  ciprofloxacin, 400 mg, Intravenous, Q12H  enoxaparin, 40 mg, Subcutaneous, Daily  FLUoxetine, 40 mg, Oral, Daily  gabapentin, 300 mg, Oral, BID  levothyroxine, 125 mcg, Oral, Early Morning  metoprolol tartrate, 25 mg, Oral, Q12H YINKA  metroNIDAZOLE, 500 mg, Intravenous, Q8H  nystatin, 500,000 Units, Swish & Spit, 4x Daily  nystatin, , Topical, BID  pantoprazole, 40 mg, Oral, Early Morning  potassium phosphate, 30 mmol, Intravenous, Once  vancomycin, 125 mg, Oral, Q6H [address]      Continuous IV Infusions:     PRN Meds:  acetaminophen, 650 mg, Oral, Q6H PRN  albuterol, 2 puff, Inhalation, Q4H PRN  ALPRAZolam, 0 5 mg, Oral, TID PRN  aluminum-magnesium hydroxide-simethicone, 30 mL, Oral, Q4H PRN  HYDROmorphone, 0 2 mg, Intravenous, Q3H PRN  HYDROmorphone, 0 5 mg, Intravenous, Q3H PRN  ( x1  10/7 thus far)  ondansetron, 4 mg, Intravenous, Q6H PRN  ( x2  10/7 thus far)  promethazine, 12 5 mg, Oral, Q6H PRN        Discharge Plan:    D    Network Utilization Review Department  ATTENTION: Please call with any questions or concerns to [phone] and carefully listen to the prompts so that you are directed to the right person  All voicemails are confidential   [employer] all requests for admission clinical reviews, approved or denied determinations and any other requests to dedicated fax number below belonging to the campus where the patient is receiving treatment   List of dedicated fax numbers for the Facilities:  [address] DENIALS (Administrative/Medical Necessity) [phone]   [address] (Maternity/NICU/Pediatrics) [phone]   6 Jennifer Kim [phone]   Houstonkit Silva  [phone]   [address] [place] [address] [address] [place] [address] [address] Mojgan [address] [place] [phone]   [address] Ulysses Macias [facility] [address] [phone]

## 2022-10-07 NOTE — PROGRESS NOTES
[address]  Progress Note - [facility] 1945, 78 y o  female MRN: [id number]  Unit/Bed#: 2 [place] [address] Encounter: [id number]  Primary Care Provider: No primary care provider on file  Date and time admitted to hospital: 10/5/2022  2:42 PM    * Bilious vomiting with nausea  Assessment & Plan  Hst of lung cancer on hospice with recent diagnosis of C Diff Colitis  Discharged home from [facility] on Saturday  Since then she has been nauseous with vomiting and diarrhea  She has been unable to eat or drink  Now with weakness and falls  · Change antibiotics from PO to IV while hospitalized  Will complete Cipro and Flagyl on 10/8/22  · S/p IVF  Will discontinue today  · Diet as tolerated  · Does not want a nutritional supplement    · Supportive care with IV Zofran and PO Phenergan PRN     Clostridium difficile colitis  Assessment & Plan  Admitted to [facility] 9/27/22 -10/1/22 with C Diff Colitis  Sent home on PO Ciprofloxacin, Flagyl, and Vancomycin pulse taper  This is her second bout of Vancomycin  Updated CT A/P: "Colonic diverticulosis with mild wall thickening involving the sigmoid colon which may represent acute or chronic changes related to diverticulitis    No pericolonic inflammatory changes are noted "  · Continue IV Ciprofloxacin and Flagyl through 10/8/22  · Continue prolonged PO Vancomycin taper   · Can resume her prescription from home on discharge  · Will stop IVF  · Advance from clear liquids to regular diet as tolerated   · Optimize electrolytes     Hypophosphatemia  Assessment & Plan  Phos 2 2, K 3 6  · Replete with KPhos 30 mmol bolus x   · Monitor     Hypomagnesemia  Assessment & Plan  Mag 1 5 -> 1 6, likely due to N/V/D   · Replete with Mag Sulfate 2 gm IV x1   · Monitor     Yeast infection  Assessment & Plan  Likely due to ABX use  · Diflucan 100 mg po x1     Oral thrush  Assessment & Plan  · Mycelex troches ordered 5x/day but changed to Nystatin swish and spit per patient's request     Pleural effusion  Assessment & Plan  CT shows small to moderate left-sided pleural effusion   Occasionally uses home O2 at 1-2 LPM  Currently on 1 5 LPM with oxygen saturations in the mid 90s  · Continual supplemental oxygen PRN   · Currently on room air sating 93-94%    Lung cancer [facility])  Assessment & Plan  On hospice with [facility]  Sent in by hospice nurse due to above  · Discussed abnormalities noted on CT scan; patient does not want aggressive workup/ management     Generalized weakness  Assessment & Plan  With falls 2/2 to nausea, vomiting, and diarrhea  Unable to eat or drink for several days  · Supportive care as above  · PT/OT eval; recommend HHS  · Patient will be staying with a friend on discharge       VTE Pharmacologic Prophylaxis: VTE Score: 3 Moderate Risk (Score 3-4) - Pharmacological DVT Prophylaxis Ordered: enoxaparin (Lovenox)  Patient Centered Rounds: I performed bedside rounds with nursing staff today  Discussions with Specialists or Other Care Team Provider: nursing, CM    Education and Discussions with Family / Patient: Updated  (daughter) via phone  Time Spent for Care: 30 minutes  More than 50% of total time spent on counseling and coordination of care as described above  Current Length of Stay: 1 day(s)  Current Patient Status: Inpatient   Certification Statement: The patient will continue to require additional inpatient hospital stay due to nausea, vomiting, poor oral intake  Discharge Plan: Anticipate discharge in 24-48 hrs to home  Code Status: Level 3 - DNAR and DNI    Subjective:   Patient seen and examined at bedside  Resting comfortably  Continues with intractable nausea  Very little oral intake due to nausea  States she tried a bite of eggs for breakfast but had to spit them out  Does not want a nutritional supplement due to the artificial flavor  Pain is controlled  Feels stronger   Reports improvement in oral thrush but now with a yeast infection  Also reports a migraine  Objective:     Vitals:   Temp (24hrs), Av 2 °F (36 8 °C), Min:97 5 °F (36 4 °C), Max:98 6 °F (37 °C)    Temp:  [97 5 °F (36 4 °C)-98 6 °F (37 °C)] 98 3 °F (36 8 °C)  HR:  [69-98] 72  Resp:  [17-18] 18  BP: (122-147)/(47-87) 147/87  SpO2:  [90 %-97 %] 93 %  Body mass index is 31 85 kg/m²  Input and Output Summary (last 24 hours): Intake/Output Summary (Last 24 hours) at 10/7/2022 1532  Last data filed at 10/7/2022 0500  Gross per 24 hour   Intake --   Output 400 ml   Net -400 ml       Physical Exam:   Physical Exam  Vitals and nursing note reviewed  Constitutional:       General: She is not in acute distress  Appearance: She is obese  She is ill-appearing  She is not toxic-appearing or diaphoretic  Comments: Pleasant female resting in bed, on room air, laying down with lights off    HENT:      Head: Normocephalic  Mouth/Throat:      Mouth: Mucous membranes are moist    Eyes:      Conjunctiva/sclera: Conjunctivae normal    Cardiovascular:      Rate and Rhythm: Normal rate  Pulmonary:      Effort: Pulmonary effort is normal  No tachypnea  Breath sounds: Examination of the right-lower field reveals decreased breath sounds  Examination of the left-lower field reveals decreased breath sounds  Decreased breath sounds present  No wheezing, rhonchi or rales  Abdominal:      General: Bowel sounds are normal  There is no distension  Palpations: Abdomen is soft  Tenderness: There is abdominal tenderness  There is no guarding or rebound  Musculoskeletal:         General: Normal range of motion  Cervical back: Normal range of motion  Right lower leg: No edema  Left lower leg: No edema  Skin:     General: Skin is warm and dry  Capillary Refill: Capillary refill takes less than 2 seconds  Neurological:      Mental Status: She is alert and oriented to person, place, and time  Mental status is at baseline  Motor: Weakness present  Psychiatric:         Mood and Affect: Mood normal          Behavior: Behavior normal          Thought Content:  Thought content normal          Judgment: Judgment normal           Additional Data:     Labs:  Results from last 7 days   Lab Units 10/06/22  0539   WBC Thousand/uL 8 40   HEMOGLOBIN g/dL 12 8   HEMATOCRIT % 40 8   PLATELETS Thousands/uL 287   NEUTROS PCT % 72   LYMPHS PCT % 16   MONOS PCT % 9   EOS PCT % 2     Results from last 7 days   Lab Units 10/07/22  0516 10/06/22  0539   SODIUM mmol/L 138 139   POTASSIUM mmol/L 3 6 3 3*   CHLORIDE mmol/L 103 102   CO2 mmol/L 31 33*   BUN mg/dL 4* 6   CREATININE mg/dL 0 79 0 80   ANION GAP mmol/L 4 4   CALCIUM mg/dL 8 0* 8 4   ALBUMIN g/dL  --  3 0*   TOTAL BILIRUBIN mg/dL  --  0 36   ALK PHOS U/L  --  56   ALT U/L  --  12   AST U/L  --  22   GLUCOSE RANDOM mg/dL 111 110                       Lines/Drains:  Invasive Devices  Report    Central Venous Catheter Line  Duration           Port A Cath Right Chest -- days                Central Line:  Goal for removal: Chronic power port              Imaging: Reviewed radiology reports from this admission including: chest CT scan, abdominal/pelvic CT and CT head    Recent Cultures (last 7 days):         Last 24 Hours Medication List:   Current Facility-Administered Medications   Medication Dose Route Frequency Provider Last Rate   • acetaminophen  650 mg Oral Q6H PRN Adrien Skiff, CRNP     • albuterol  2 puff Inhalation Q4H PRN Adrien Skiff, CRNP     • ALPRAZolam  0 5 mg Oral TID PRN Adrien Skiff, CRNP     • aluminum-magnesium hydroxide-simethicone  30 mL Oral Q4H PRN Adrien Skiff, CRNP     • butalbital-acetaminophen-caffeine  1 tablet Oral Once Adrien Skiff, CRNP     • ciprofloxacin  400 mg Intravenous Q12H Adrien Skiff, CRNP 400 mg (10/07/22 5574)   • enoxaparin  40 mg Subcutaneous Daily Adrien Skiff, CRNP     • FLUoxetine  40 mg Oral Daily SHERRY Patel     • gabapentin  300 mg Oral BID SHERRY Patel     • HYDROmorphone  0 2 mg Intravenous Q3H PRN SHERRY Patel     • HYDROmorphone  0 5 mg Intravenous Q3H PRN SHERRY Patel     • levothyroxine  125 mcg Oral Early Morning SHERRY Patel     • metoprolol tartrate  25 mg Oral Q12H [facility] SHERRY Patel     • metroNIDAZOLE  500 mg Intravenous Q8H SHERRY Patel 500 mg (10/07/22 1818)   • nystatin  500,000 Units Swish & Spit 4x Daily SHERRY Patel     • nystatin   Topical BID SHERRY Patel     • ondansetron  4 mg Intravenous Q6H PRN SHERRY Patel     • pantoprazole  40 mg Oral Early Morning SHERRY Patel     • potassium phosphate  30 mmol Intravenous Once SHERRY Patel 30 mmol (10/07/22 2307)   • promethazine  12 5 mg Oral Q6H PRN SHERRY Patel     • vancomycin  125 mg Oral Q6H [facility] SHERRY Patel          Today, Patient Was Seen By: Mj Espino    **Please Note: This note may have been constructed using a voice recognition system  **

## 2022-10-07 NOTE — ASSESSMENT & PLAN NOTE
Hst of lung cancer on hospice with recent diagnosis of C Diff Colitis  Discharged home from [facility] on Saturday  Since then she has been nauseous with vomiting and diarrhea  She has been unable to eat or drink  Now with weakness and falls  · Change antibiotics from PO to IV while hospitalized  Will complete Cipro and Flagyl on 10/8/22  · Diet as tolerated; patient continues to report anorexia and nausea, will trial Reglan p o   Prior to mealtimes  · Does not want a nutritional supplement    · Supportive care with IV Zofran and PO Phenergan PRN

## 2022-10-08 LAB
ALBUMIN SERPL BCP-MCNC: 2.5 G/DL (ref 3.5–5)
ALP SERPL-CCNC: 48 U/L (ref 46–116)
ALT SERPL W P-5'-P-CCNC: 8 U/L (ref 12–78)
ANION GAP SERPL CALCULATED.3IONS-SCNC: 5 MMOL/L (ref 4–13)
AST SERPL W P-5'-P-CCNC: 13 U/L (ref 5–45)
BASOPHILS # BLD AUTO: 0.04 THOUSANDS/ΜL (ref 0–0.1)
BASOPHILS NFR BLD AUTO: 0 % (ref 0–1)
BILIRUB SERPL-MCNC: 0.46 MG/DL (ref 0.2–1)
BUN SERPL-MCNC: 6 MG/DL (ref 5–25)
CALCIUM ALBUM COR SERPL-MCNC: 9.4 MG/DL (ref 8.3–10.1)
CALCIUM SERPL-MCNC: 8.2 MG/DL (ref 8.3–10.1)
CHLORIDE SERPL-SCNC: 102 MMOL/L (ref 96–108)
CO2 SERPL-SCNC: 31 MMOL/L (ref 21–32)
CREAT SERPL-MCNC: 0.71 MG/DL (ref 0.6–1.3)
EOSINOPHIL # BLD AUTO: 0.3 THOUSAND/ΜL (ref 0–0.61)
EOSINOPHIL NFR BLD AUTO: 3 % (ref 0–6)
ERYTHROCYTE [DISTWIDTH] IN BLOOD BY AUTOMATED COUNT: 13.7 % (ref 11.6–15.1)
GFR SERPL CREATININE-BSD FRML MDRD: 83 ML/MIN/1.73SQ M
GLUCOSE SERPL-MCNC: 96 MG/DL (ref 65–140)
HCT VFR BLD AUTO: 34.8 % (ref 34.8–46.1)
HGB BLD-MCNC: 10.9 G/DL (ref 11.5–15.4)
IMM GRANULOCYTES # BLD AUTO: 0.05 THOUSAND/UL (ref 0–0.2)
IMM GRANULOCYTES NFR BLD AUTO: 1 % (ref 0–2)
LYMPHOCYTES # BLD AUTO: 1.03 THOUSANDS/ΜL (ref 0.6–4.47)
LYMPHOCYTES NFR BLD AUTO: 10 % (ref 14–44)
MAGNESIUM SERPL-MCNC: 1.8 MG/DL (ref 1.6–2.6)
MCH RBC QN AUTO: 30 PG (ref 26.8–34.3)
MCHC RBC AUTO-ENTMCNC: 31.3 G/DL (ref 31.4–37.4)
MCV RBC AUTO: 96 FL (ref 82–98)
MONOCYTES # BLD AUTO: 1.06 THOUSAND/ΜL (ref 0.17–1.22)
MONOCYTES NFR BLD AUTO: 10 % (ref 4–12)
NEUTROPHILS # BLD AUTO: 8.13 THOUSANDS/ΜL (ref 1.85–7.62)
NEUTS SEG NFR BLD AUTO: 76 % (ref 43–75)
NRBC BLD AUTO-RTO: 0 /100 WBCS
PHOSPHATE SERPL-MCNC: 3.3 MG/DL (ref 2.3–4.1)
PLATELET # BLD AUTO: 237 THOUSANDS/UL (ref 149–390)
PMV BLD AUTO: 8.9 FL (ref 8.9–12.7)
POTASSIUM SERPL-SCNC: 3.9 MMOL/L (ref 3.5–5.3)
PROT SERPL-MCNC: 5.6 G/DL (ref 6.4–8.4)
RBC # BLD AUTO: 3.63 MILLION/UL (ref 3.81–5.12)
SODIUM SERPL-SCNC: 138 MMOL/L (ref 135–147)
WBC # BLD AUTO: 10.61 THOUSAND/UL (ref 4.31–10.16)

## 2022-10-08 PROCEDURE — 83735 ASSAY OF MAGNESIUM: CPT | Performed by: NURSE PRACTITIONER

## 2022-10-08 PROCEDURE — 80053 COMPREHEN METABOLIC PANEL: CPT | Performed by: NURSE PRACTITIONER

## 2022-10-08 PROCEDURE — 99232 SBSQ HOSP IP/OBS MODERATE 35: CPT | Performed by: NURSE PRACTITIONER

## 2022-10-08 PROCEDURE — 84100 ASSAY OF PHOSPHORUS: CPT | Performed by: NURSE PRACTITIONER

## 2022-10-08 PROCEDURE — 85025 COMPLETE CBC W/AUTO DIFF WBC: CPT | Performed by: NURSE PRACTITIONER

## 2022-10-08 RX ORDER — GINSENG 100 MG
1 CAPSULE ORAL DAILY
Status: DISCONTINUED | OUTPATIENT
Start: 2022-10-08 | End: 2022-10-09 | Stop reason: HOSPADM

## 2022-10-08 RX ORDER — METOCLOPRAMIDE 10 MG/1
10 TABLET ORAL
Status: DISCONTINUED | OUTPATIENT
Start: 2022-10-08 | End: 2022-10-09 | Stop reason: HOSPADM

## 2022-10-08 RX ADMIN — HYDROMORPHONE HYDROCHLORIDE 0.5 MG: 1 INJECTION, SOLUTION INTRAMUSCULAR; INTRAVENOUS; SUBCUTANEOUS at 06:19

## 2022-10-08 RX ADMIN — HYDROMORPHONE HYDROCHLORIDE 0.5 MG: 1 INJECTION, SOLUTION INTRAMUSCULAR; INTRAVENOUS; SUBCUTANEOUS at 11:10

## 2022-10-08 RX ADMIN — METRONIDAZOLE 500 MG: 500 SOLUTION INTRAVENOUS at 14:35

## 2022-10-08 RX ADMIN — METRONIDAZOLE 500 MG: 500 SOLUTION INTRAVENOUS at 06:21

## 2022-10-08 RX ADMIN — ONDANSETRON 4 MG: 2 INJECTION INTRAMUSCULAR; INTRAVENOUS at 06:19

## 2022-10-08 RX ADMIN — CIPROFLOXACIN 400 MG: 2 INJECTION, SOLUTION INTRAVENOUS at 18:00

## 2022-10-08 RX ADMIN — FLUOXETINE HYDROCHLORIDE 40 MG: 20 CAPSULE ORAL at 09:01

## 2022-10-08 RX ADMIN — Medication 125 MG: at 06:20

## 2022-10-08 RX ADMIN — ALPRAZOLAM 0.5 MG: 0.5 TABLET ORAL at 19:23

## 2022-10-08 RX ADMIN — NYSTATIN 500000 UNITS: 100000 SUSPENSION ORAL at 21:26

## 2022-10-08 RX ADMIN — METOPROLOL TARTRATE 25 MG: 25 TABLET, FILM COATED ORAL at 09:01

## 2022-10-08 RX ADMIN — ALPRAZOLAM 0.5 MG: 0.5 TABLET ORAL at 11:01

## 2022-10-08 RX ADMIN — NYSTATIN 500000 UNITS: 100000 SUSPENSION ORAL at 11:01

## 2022-10-08 RX ADMIN — BACITRACIN 1 SMALL APPLICATION: 500 OINTMENT TOPICAL at 11:01

## 2022-10-08 RX ADMIN — METOCLOPRAMIDE 10 MG: 10 TABLET ORAL at 16:03

## 2022-10-08 RX ADMIN — Medication 125 MG: at 11:02

## 2022-10-08 RX ADMIN — LEVOTHYROXINE SODIUM 125 MCG: 125 TABLET ORAL at 06:20

## 2022-10-08 RX ADMIN — METOPROLOL TARTRATE 25 MG: 25 TABLET, FILM COATED ORAL at 21:26

## 2022-10-08 RX ADMIN — Medication 125 MG: at 17:52

## 2022-10-08 RX ADMIN — HYDROMORPHONE HYDROCHLORIDE 0.5 MG: 1 INJECTION, SOLUTION INTRAMUSCULAR; INTRAVENOUS; SUBCUTANEOUS at 18:05

## 2022-10-08 RX ADMIN — NYSTATIN: 100000 POWDER TOPICAL at 17:53

## 2022-10-08 RX ADMIN — METRONIDAZOLE 500 MG: 500 SOLUTION INTRAVENOUS at 21:25

## 2022-10-08 RX ADMIN — PANTOPRAZOLE SODIUM 40 MG: 40 TABLET, DELAYED RELEASE ORAL at 06:20

## 2022-10-08 RX ADMIN — NYSTATIN 500000 UNITS: 100000 SUSPENSION ORAL at 09:01

## 2022-10-08 RX ADMIN — ALUMINA, MAGNESIA, AND SIMETHICONE ORAL SUSPENSION REGULAR STRENGTH 30 ML: 1200; 1200; 120 SUSPENSION ORAL at 16:04

## 2022-10-08 RX ADMIN — NYSTATIN 500000 UNITS: 100000 SUSPENSION ORAL at 17:52

## 2022-10-08 RX ADMIN — NYSTATIN: 100000 POWDER TOPICAL at 09:02

## 2022-10-08 RX ADMIN — ENOXAPARIN SODIUM 40 MG: 40 INJECTION SUBCUTANEOUS at 09:01

## 2022-10-08 RX ADMIN — GABAPENTIN 300 MG: 300 CAPSULE ORAL at 17:52

## 2022-10-08 RX ADMIN — GABAPENTIN 300 MG: 300 CAPSULE ORAL at 09:01

## 2022-10-08 RX ADMIN — CIPROFLOXACIN 400 MG: 2 INJECTION, SOLUTION INTRAVENOUS at 07:14

## 2022-10-08 RX ADMIN — HYDROMORPHONE HYDROCHLORIDE 0.5 MG: 1 INJECTION, SOLUTION INTRAMUSCULAR; INTRAVENOUS; SUBCUTANEOUS at 21:23

## 2022-10-08 NOTE — UTILIZATION REVIEW
Continued Stay Review    Date: *10-08-22                        Current Patient Class: inpatient  Current Level of Care: M/S    HPI:77 y o  female initially admitted on *10-06-22      Assessment/Plan: patient continues with uncontroled nausea, poor po intake  IV Zofran x 2 continues with pain requiring IV Dilaudid x 3 C-diff colitis Continue IV Ciprofloxacin and Flagyl through 10/8/22 Continue prolonged PO Vancomycin taper Able to get 2 sips of tea and a mouthful of cream await when she felt extremely nauseous and could not eat anymore    Feels very anorexic and continues weak dry MM lung sounds diminished b/l    Intake/Output Summary (Last 24 hours) at 10/8/2022 1550  Last data filed at 10/7/2022 2000      Gross per 24 hour   Intake 600 ml   Output --   Net 600 ml     Vital Signs:   Time Temp Pulse Resp BP MAP (mmHg) SpO2 Calculated FIO2 (%) - Nasal Cannula Nasal Cannula O2 Flow Rate (L/min) O2 Device Patient Position - Orthostatic VS   10/08/22 07:46:15 97 9 °F (36 6 °C) 67 19 147/83 104 91 % -- -- -- --   10/07/22 22:49:17 98 5 °F (36 9 °C) 67 20 109/64 79 91 % -- -- -- --   10/07/22 21:30:56 -- 77 -- 135/74 94 90 % -- -- -- Lying   10/07/22 19:58:11 97 8 °F (36 6 °C) 81 19 139/79 99 92 % 28 2 L/min Nasal cannula Sitting        Pertinent Labs/Diagnostic Results:       Results from last 7 days   Lab Units 10/08/22  0616 10/06/22  0539 10/05/22  1549   WBC Thousand/uL 10 61* 8 40 9 78   HEMOGLOBIN g/dL 10 9* 12 8 13 9   HEMATOCRIT % 34 8 40 8 42 9   PLATELETS Thousands/uL 237 287 301   NEUTROS ABS Thousands/µL 8 13* 6 04 8 38*         Results from last 7 days   Lab Units 10/08/22  0616 10/07/22  0516 10/06/22  0539 10/05/22  1549   SODIUM mmol/L 138 138 139 139   POTASSIUM mmol/L 3 9 3 6 3 3* 3 7   CHLORIDE mmol/L 102 103 102 101   CO2 mmol/L 31 31 33* 30   ANION GAP mmol/L 5 4 4 8   BUN mg/dL 6 4* 6 9   CREATININE mg/dL 0 71 0 79 0 80 0 83   EGFR ml/min/1 73sq m 83 73 72 69   CALCIUM mg/dL 8 2* 8 0* 8 4 8 6 MAGNESIUM mg/dL 1 8 1 6 1 9 1 5*   PHOSPHORUS mg/dL 3 3 2 2* 2 6  --      Results from last 7 days   Lab Units 10/08/22  0616 10/06/22  0539 10/05/22  1549   AST U/L 13 22 28   ALT U/L 8* 12 11*   ALK PHOS U/L 48 56 61   TOTAL PROTEIN g/dL 5 6* 6 2* 7 2   ALBUMIN g/dL 2 5* 3 0* 3 4*   TOTAL BILIRUBIN mg/dL 0 46 0 36 0 34         Results from last 7 days   Lab Units 10/08/22  0616 10/07/22  0516 10/06/22  0539 10/05/22  1549   GLUCOSE RANDOM mg/dL 96 111 110 136     Medications:   Scheduled Medications:  bacitracin, 1 small application, Topical, Daily  butalbital-acetaminophen-caffeine, 1 tablet, Oral, Once  ciprofloxacin, 400 mg, Intravenous, Q12H  enoxaparin, 40 mg, Subcutaneous, Daily  FLUoxetine, 40 mg, Oral, Daily  gabapentin, 300 mg, Oral, BID  levothyroxine, 125 mcg, Oral, Early Morning  metoprolol tartrate, 25 mg, Oral, Q12H YINKA  metroNIDAZOLE, 500 mg, Intravenous, Q8H  nystatin, 500,000 Units, Swish & Spit, 4x Daily  nystatin, , Topical, BID  pantoprazole, 40 mg, Oral, Early Morning  scopolamine, 1 patch, Transdermal, Q72H  vancomycin, 125 mg, Oral, Q6H YINKA      Continuous IV Infusions:     PRN Meds:  acetaminophen, 650 mg, Oral, Q6H PRN  albuterol, 2 puff, Inhalation, Q4H PRN  ALPRAZolam, 0 5 mg, Oral, TID PRN  aluminum-magnesium hydroxide-simethicone, 30 mL, Oral, Q4H PRN  HYDROmorphone, 0 2 mg, Intravenous, Q3H PRN  HYDROmorphone, 0 5 mg, Intravenous, Q3H PRN  ondansetron, 4 mg, Intravenous, Q6H PRN  promethazine, 12 5 mg, Oral, Q6H PRN        Discharge Plan: d    Network Utilization Review Department  ATTENTION: Please call with any questions or concerns to [phone] and carefully listen to the prompts so that you are directed to the right person  All voicemails are confidential   Ty Limb all requests for admission clinical reviews, approved or denied determinations and any other requests to dedicated fax number below belonging to the campus where the patient is receiving treatment   List of dedicated fax numbers for the Facilities:  [address] [place] [address] DENIALS (Administrative/Medical Necessity) [phone]   [address]  (Maternity/NICU/Pediatrics) [phone]   [address] [phone]   Bree Silva 77 [phone]   [address] [facility] [place] [address] [address] Mojgan Corley 28 [phone]   [address] Ulysses Macias [facility] [address] [phone]

## 2022-10-08 NOTE — PROGRESS NOTES
Kailash 47  Progress Note - Herminia Lepe 1945, 78 y o  female MRN: [id number]  Unit/Bed#: [address] [place] [address] Encounter: [id number]  Primary Care Provider: No primary care provider on file  Date and time admitted to hospital: 10/5/2022  2:42 PM    * Bilious vomiting with nausea  Assessment & Plan  Hst of lung cancer on hospice with recent diagnosis of C Diff Colitis  Discharged home from [facility] on Saturday  Since then she has been nauseous with vomiting and diarrhea  She has been unable to eat or drink  Now with weakness and falls  · Change antibiotics from PO to IV while hospitalized  Will complete Cipro and Flagyl on 10/8/22  · Diet as tolerated; patient continues to report anorexia and nausea, will trial Reglan p o  Prior to mealtimes  · Does not want a nutritional supplement    · Supportive care with IV Zofran and PO Phenergan PRN     Clostridium difficile colitis  Assessment & Plan  Admitted to [facility] 9/27/22 -10/1/22 with C Diff Colitis  Sent home on PO Ciprofloxacin, Flagyl, and Vancomycin pulse taper  This is her second bout of Vancomycin  Updated CT A/P: "Colonic diverticulosis with mild wall thickening involving the sigmoid colon which may represent acute or chronic changes related to diverticulitis  No pericolonic inflammatory changes are noted "  · Continue IV Ciprofloxacin and Flagyl through 10/8/22  · Continue prolonged PO Vancomycin taper   · Can resume her prescription from home on discharge  · Advance from clear liquids to regular diet as tolerated; patient continues to report anorexia and nausea  · Optimize electrolytes     Generalized weakness  Assessment & Plan  With falls 2/2 to nausea, vomiting, and diarrhea  Unable to eat or drink for several days    · Supportive care as above  · PT/OT eval; recommend HHS  · Patient will be staying with a friend on discharge     Pleural effusion  Assessment & Plan  CT shows small to moderate left-sided pleural effusion   Occasionally uses home O2 at 1-2 LPM  Currently on 1 5 LPM with oxygen saturations in the mid 90s  · Continual supplemental oxygen PRN   · Currently on room air sating 93-94%    Hypomagnesemia  Assessment & Plan  Mag 1 5 -> 1 6, likely due to N/V/D   · Improved to 1 8 after repletion  · Repeat magnesium in a m  As poor oral intake continues    Lung cancer [facility])  Assessment & Plan  On hospice with [facility]  Sent in by hospice nurse due to above  · Previous provider discussed abnormalities noted on CT scan; patient does not want aggressive workup/ management     Oral thrush  Assessment & Plan  · Mycelex troches ordered 5x/day but changed to Nystatin swish and spit per patient's request     Yeast infection  Assessment & Plan  Likely due to ABX use  · Diflucan 100 mg po x1 on 10/07    Hypophosphatemia  Assessment & Plan  Phos 2 2, K 3 6  · Phosphorus improved to 3 3, potassium 3 9   · Monitor with BMP in a m  VTE Pharmacologic Prophylaxis:   Pharmacologic: Enoxaparin (Lovenox)  Mechanical VTE Prophylaxis in Place: Yes    Patient Centered Rounds: I have performed bedside rounds with nursing staff today  Discussions with Specialists or Other Care Team Provider: Yes  Education and Discussions with Family / Patient:Yes  Time Spent for Care: 30 minutes  More than 50% of total time spent on counseling and coordination of care as described above  Current Length of Stay: 2 day(s)  Current Patient Status: Inpatient     Discharge Plan: Not stable for discharge today; continues with uncontrolled nausea, trialing reglan prior to meals, repeat labs in am      Code Status: Level 3 - DNAR and DNI      Subjective:   Patient seen sitting up on side of bed, reports that she was able to get 2 sips of tea and a mouthful of cream await when she felt extremely nauseous and could not eat anymore  Feels very anorexic and continues weak    She became very tearful during our conversation, voicing feelings of despair regarding her condition  Objective:     Vitals:   Temp (24hrs), Av 1 °F (36 7 °C), Min:97 8 °F (36 6 °C), Max:98 5 °F (36 9 °C)    Temp:  [97 8 °F (36 6 °C)-98 5 °F (36 9 °C)] 98 °F (36 7 °C)  HR:  [66-81] 66  Resp:  [19-20] 19  BP: (100-147)/(62-83) 100/62  SpO2:  [90 %-94 %] 94 %  Body mass index is 31 92 kg/m²  Input and Output Summary (last 24 hours): Intake/Output Summary (Last 24 hours) at 10/8/2022 1550  Last data filed at 10/7/2022 2000  Gross per 24 hour   Intake 600 ml   Output --   Net 600 ml        Physical Exam:     Physical Exam  Vitals and nursing note reviewed  Constitutional:       General: She is not in acute distress  HENT:      Head: Normocephalic  Nose: Nose normal       Mouth/Throat:      Mouth: Mucous membranes are dry  Eyes:      Extraocular Movements: Extraocular movements intact  Conjunctiva/sclera: Conjunctivae normal       Pupils: Pupils are equal, round, and reactive to light  Cardiovascular:      Rate and Rhythm: Normal rate and regular rhythm  Pulses: Normal pulses  Heart sounds: Normal heart sounds  Pulmonary:      Effort: Pulmonary effort is normal       Comments: Diminished bilaterally  Abdominal:      General: Bowel sounds are normal  There is no distension  Palpations: Abdomen is soft  Tenderness: There is no abdominal tenderness  Genitourinary:     Comments: Voiding spontaneously  Musculoskeletal:         General: Normal range of motion  Cervical back: Normal range of motion  Skin:     General: Skin is warm and dry  Capillary Refill: Capillary refill takes less than 2 seconds  Neurological:      General: No focal deficit present  Mental Status: She is alert and oriented to person, place, and time  Psychiatric:         Attention and Perception: Attention normal          Mood and Affect: Mood is depressed  Affect is tearful  Speech: Speech normal          Behavior: Behavior normal  Behavior is cooperative  Thought Content: Thought content normal          Additional Data:     Labs:    Results from last 7 days   Lab Units 10/08/22  0616 10/06/22  0539 10/05/22  1549   WBC Thousand/uL 10 61* 8 40 9 78   HEMOGLOBIN g/dL 10 9* 12 8 13 9   HEMATOCRIT % 34 8 40 8 42 9   PLATELETS Thousands/uL 237 287 301   NEUTROS PCT % 76* 72 86*     Results from last 7 days   Lab Units 10/08/22  0616 10/07/22  0516 10/06/22  0539 10/05/22  1549   SODIUM mmol/L 138 138 139 139   POTASSIUM mmol/L 3 9 3 6 3 3* 3 7   CHLORIDE mmol/L 102 103 102 101   CO2 mmol/L 31 31 33* 30   BUN mg/dL 6 4* 6 9   CREATININE mg/dL 0 71 0 79 0 80 0 83   CALCIUM mg/dL 8 2* 8 0* 8 4 8 6   TOTAL BILIRUBIN mg/dL 0 46  --  0 36 0 34   ALK PHOS U/L 48  --  56 61   ALT U/L 8*  --  12 11*   AST U/L 13  --  22 28             No results found for: HGBA1C            * I Have Reviewed All Lab Data Listed Above  * Additional Pertinent Lab Tests Reviewed: All Labs Within Last 24 Hours Reviewed    Imaging:     CT head without contrast   Final Result by Adan Segovia MD (10/05 1658)      Hypodense areas in the left frontal and left occipital lobes which may represent prior infarcts, however, metastatic disease is not excluded in this setting and consider MRI for further evaluation if clinically indicated  Any prior studies would    certainly be helpful in this regard  The study was marked in EPIC for significant notification and follow-up notification  Workstation performed: [id number]         CT chest abdomen pelvis w contrast   Final Result by Adan Segovia MD (10/05 1656)      Background emphysema with postsurgical changes in the left upper lobe  Complete collapse of the left upper lobe with obstruction of the left upper lobe bronchus for which underlying endobronchial lesion is not excluded  Prior exams would be helpful to    assess for stability        Multiple nodules scattered throughout the right lung the largest in the right lower lobe measuring 5 mm   Continued follow-up suggested  Small to moderate left-sided pleural effusion of uncertain etiology  Colonic diverticulosis with mild wall thickening involving the sigmoid colon which may represent acute or chronic changes related to diverticulitis  No pericolonic inflammatory changes are noted              Workstation performed: [id number]           Imaging Reports Reviewed by myself    Cultures:   Blood Culture: No results found for: BLOODCX  Urine Culture: No results found for: URINECX  Sputum Culture: No components found for: SPUTUMCX  Wound Culture: No results found for: WOUNDCULT    Last 24 Hours Medication List:   Current Facility-Administered Medications   Medication Dose Route Frequency Provider Last Rate   • acetaminophen  650 mg Oral Q6H PRN Chel Humphrey, TRAYNP     • albuterol  2 puff Inhalation Q4H PRN TRAY GunterNP     • ALPRAZolam  0 5 mg Oral TID PRN TRAY GunterNP     • aluminum-magnesium hydroxide-simethicone  30 mL Oral Q4H PRN Chel Humphrey, TRAYNP     • bacitracin  1 small application Topical Daily SHERRY Porter     • butalbital-acetaminophen-caffeine  1 tablet Oral Once SHERRY Gunter     • ciprofloxacin  400 mg Intravenous Q12H TRAY GunterNP 400 mg (10/08/22 0714)   • enoxaparin  40 mg Subcutaneous Daily TRAY GunterNP     • FLUoxetine  40 mg Oral Daily TRAY GunterNP     • gabapentin  300 mg Oral BID TRAY GunterNP     • HYDROmorphone  0 2 mg Intravenous Q3H PRN TRAY GunterNP     • HYDROmorphone  0 5 mg Intravenous Q3H PRN TRAY GunterNP     • levothyroxine  125 mcg Oral Early Morning TRAY GunterNP     • metoclopramide  10 mg Oral TID AC SHERRY Porter     • metoprolol tartrate  25 [facility]12H [facility]     • metroNIDAZOLE  500 mg Intravenous Q8H Andrae Angeles SHERRY Mack 500 mg (10/08/22 5)   • nystatin  500,000 Units Swish & Spit 4x Daily SHERRY Arreguin     • nystatin   Topical BID SHERRY Arreguin     • ondansetron  4 mg Intravenous Q6H PRN SHERRY Arreguin     • pantoprazole  40 mg Oral Early Morning SHERRY Arreguin     • promethazine  12 5 mg Oral Q6H PRN SHERRY Arreguin     • scopolamine  1 patch Transdermal Q72H SHERRY Arreguin     • vancomycin  125 mg Oral Q6H [facility] SHERRY Mckenna          Today, Patient Was Seen By: Kaelyn Bey    ** Please Note: Dragon 360 Dictation voice to text software may have been used in the creation of this document   **

## 2022-10-08 NOTE — PLAN OF CARE
Problem: Nutrition/Hydration-ADULT  Goal: Nutrient/Hydration intake appropriate for improving, restoring or maintaining nutritional needs  Description: Monitor and assess patient's nutrition/hydration status for malnutrition  Collaborate with interdisciplinary team and initiate plan and interventions as ordered  Monitor patient's weight and dietary intake as ordered or per policy  Utilize nutrition screening tool and intervene as necessary  Determine patient's food preferences and provide high-protein, high-caloric foods as appropriate       INTERVENTIONS:  - Monitor oral intake, urinary output, labs, and treatment plans  - Assess nutrition and hydration status and recommend course of action  - Evaluate amount of meals eaten  - Assist patient with eating if necessary   - Allow adequate time for meals  - Recommend/ encourage appropriate diets, oral nutritional supplements, and vitamin/mineral supplements  - Assess need for intravenous fluids  - Provide specific nutrition/hydration education as appropriate  - Include patient/family/caregiver in decisions related to nutrition  Outcome: Progressing     Problem: Prexisting or High Potential for Compromised Skin Integrity  Goal: Skin integrity is maintained or improved  Description: INTERVENTIONS:  - Identify patients at risk for skin breakdown  - Assess and monitor skin integrity  - Assess and monitor nutrition and hydration status  - Monitor labs   - Assess for incontinence   - Turn and reposition patient  - Assist with mobility/ambulation  - Relieve pressure over bony prominences  - Avoid friction and shearing  - Provide appropriate hygiene as needed including keeping skin clean and dry  - Evaluate need for skin moisturizer/barrier cream  - Collaborate with interdisciplinary team   - Patient/family teaching  - Consider wound care consult   Outcome: Progressing     Problem: MOBILITY - ADULT  Goal: Maintain or return to baseline ADL function  Description: INTERVENTIONS:  -  Assess patient's ability to carry out ADLs; assess patient's baseline for ADL function and identify physical deficits which impact ability to perform ADLs (bathing, care of mouth/teeth, toileting, grooming, dressing, etc )  - Assess/evaluate cause of self-care deficits   - Assess range of motion  - Assess patient's mobility; develop plan if impaired  - Assess patient's need for assistive devices and provide as appropriate  - Encourage maximum independence but intervene and supervise when necessary  - Involve family in performance of ADLs  - Assess for home care needs following discharge   - Consider OT consult to assist with ADL evaluation and planning for discharge  - Provide patient education as appropriate  Outcome: Progressing  Goal: Maintains/Returns to pre admission functional level  Description: INTERVENTIONS:  - Perform BMAT or MOVE assessment daily    - Set and communicate daily mobility goal to care team and patient/family/caregiver  - Collaborate with rehabilitation services on mobility goals if consulted  - Perform Range of Motion 2 times a day    - Out of bed for toileting  - Record patient progress and toleration of activity level   Outcome: Progressing     Problem: Potential for Falls  Goal: Patient will remain free of falls  Description: INTERVENTIONS:  - Educate patient/family on patient safety including physical limitations  - Instruct patient to call for assistance with activity   - Consult OT/PT to assist with strengthening/mobility   - Keep Call bell within reach  - Keep bed low and locked with side rails adjusted as appropriate  - Keep care items and personal belongings within reach  - Initiate and maintain comfort rounds  - Make Fall Risk Sign visible to staff  - Offer Toileting every 2 Hours, in advance of need  - Initiate/Maintain bed alarm  - Apply yellow socks and bracelet for high fall risk patients  - Consider moving patient to room near nurses station  Outcome: Progressing

## 2022-10-09 VITALS
OXYGEN SATURATION: 97 % | WEIGHT: 180.19 LBS | HEART RATE: 70 BPM | RESPIRATION RATE: 18 BRPM | SYSTOLIC BLOOD PRESSURE: 110 MMHG | BODY MASS INDEX: 31.93 KG/M2 | HEIGHT: 63 IN | TEMPERATURE: 98.3 F | DIASTOLIC BLOOD PRESSURE: 63 MMHG

## 2022-10-09 LAB
ANION GAP SERPL CALCULATED.3IONS-SCNC: 3 MMOL/L (ref 4–13)
BUN SERPL-MCNC: 8 MG/DL (ref 5–25)
CALCIUM SERPL-MCNC: 8 MG/DL (ref 8.3–10.1)
CHLORIDE SERPL-SCNC: 103 MMOL/L (ref 96–108)
CO2 SERPL-SCNC: 33 MMOL/L (ref 21–32)
CREAT SERPL-MCNC: 0.87 MG/DL (ref 0.6–1.3)
ERYTHROCYTE [DISTWIDTH] IN BLOOD BY AUTOMATED COUNT: 13.7 % (ref 11.6–15.1)
GFR SERPL CREATININE-BSD FRML MDRD: 65 ML/MIN/1.73SQ M
GLUCOSE SERPL-MCNC: 95 MG/DL (ref 65–140)
HCT VFR BLD AUTO: 34.7 % (ref 34.8–46.1)
HGB BLD-MCNC: 11 G/DL (ref 11.5–15.4)
MAGNESIUM SERPL-MCNC: 1.6 MG/DL (ref 1.6–2.6)
MCH RBC QN AUTO: 30.5 PG (ref 26.8–34.3)
MCHC RBC AUTO-ENTMCNC: 31.7 G/DL (ref 31.4–37.4)
MCV RBC AUTO: 96 FL (ref 82–98)
PLATELET # BLD AUTO: 243 THOUSANDS/UL (ref 149–390)
PMV BLD AUTO: 9.1 FL (ref 8.9–12.7)
POTASSIUM SERPL-SCNC: 4.3 MMOL/L (ref 3.5–5.3)
RBC # BLD AUTO: 3.61 MILLION/UL (ref 3.81–5.12)
SODIUM SERPL-SCNC: 139 MMOL/L (ref 135–147)
WBC # BLD AUTO: 9.86 THOUSAND/UL (ref 4.31–10.16)

## 2022-10-09 PROCEDURE — 99223 1ST HOSP IP/OBS HIGH 75: CPT | Performed by: NURSE PRACTITIONER

## 2022-10-09 PROCEDURE — 80048 BASIC METABOLIC PNL TOTAL CA: CPT | Performed by: NURSE PRACTITIONER

## 2022-10-09 PROCEDURE — 83735 ASSAY OF MAGNESIUM: CPT | Performed by: NURSE PRACTITIONER

## 2022-10-09 PROCEDURE — 85027 COMPLETE CBC AUTOMATED: CPT | Performed by: NURSE PRACTITIONER

## 2022-10-09 RX ORDER — GABAPENTIN 300 MG/1
300 CAPSULE ORAL 2 TIMES DAILY
Qty: 60 CAPSULE | Refills: 0 | Status: SHIPPED | OUTPATIENT
Start: 2022-10-09 | End: 2022-11-08

## 2022-10-09 RX ORDER — SCOLOPAMINE TRANSDERMAL SYSTEM 1 MG/1
1 PATCH, EXTENDED RELEASE TRANSDERMAL
Qty: 10 PATCH | Refills: 0 | Status: SHIPPED | OUTPATIENT
Start: 2022-10-10 | End: 2022-11-09

## 2022-10-09 RX ORDER — METOCLOPRAMIDE 10 MG/1
10 TABLET ORAL
Qty: 90 TABLET | Refills: 0 | Status: SHIPPED | OUTPATIENT
Start: 2022-10-09 | End: 2022-11-08

## 2022-10-09 RX ORDER — ONDANSETRON 4 MG/1
4 TABLET, FILM COATED ORAL EVERY 8 HOURS PRN
Qty: 20 TABLET | Refills: 0 | Status: SHIPPED | OUTPATIENT
Start: 2022-10-09

## 2022-10-09 RX ORDER — PROMETHAZINE HYDROCHLORIDE 12.5 MG/1
12.5 TABLET ORAL EVERY 6 HOURS PRN
Qty: 30 TABLET | Refills: 0 | Status: SHIPPED | OUTPATIENT
Start: 2022-10-09

## 2022-10-09 RX ORDER — PANTOPRAZOLE SODIUM 40 MG/1
40 TABLET, DELAYED RELEASE ORAL
Qty: 30 TABLET | Refills: 0 | Status: SHIPPED | OUTPATIENT
Start: 2022-10-10 | End: 2022-11-09

## 2022-10-09 RX ADMIN — GUAIFENESIN 200 MG: 100 SOLUTION ORAL at 05:17

## 2022-10-09 RX ADMIN — METOPROLOL TARTRATE 25 MG: 25 TABLET, FILM COATED ORAL at 09:16

## 2022-10-09 RX ADMIN — Medication 125 MG: at 05:09

## 2022-10-09 RX ADMIN — NYSTATIN 500000 UNITS: 100000 SUSPENSION ORAL at 12:27

## 2022-10-09 RX ADMIN — GABAPENTIN 300 MG: 300 CAPSULE ORAL at 09:16

## 2022-10-09 RX ADMIN — Medication 125 MG: at 12:27

## 2022-10-09 RX ADMIN — ENOXAPARIN SODIUM 40 MG: 40 INJECTION SUBCUTANEOUS at 09:16

## 2022-10-09 RX ADMIN — PANTOPRAZOLE SODIUM 40 MG: 40 TABLET, DELAYED RELEASE ORAL at 05:09

## 2022-10-09 RX ADMIN — Medication 125 MG: at 00:02

## 2022-10-09 RX ADMIN — NYSTATIN 500000 UNITS: 100000 SUSPENSION ORAL at 09:15

## 2022-10-09 RX ADMIN — HYDROMORPHONE HYDROCHLORIDE 0.5 MG: 1 INJECTION, SOLUTION INTRAMUSCULAR; INTRAVENOUS; SUBCUTANEOUS at 02:49

## 2022-10-09 RX ADMIN — METRONIDAZOLE 500 MG: 500 SOLUTION INTRAVENOUS at 05:09

## 2022-10-09 RX ADMIN — ONDANSETRON 4 MG: 2 INJECTION INTRAMUSCULAR; INTRAVENOUS at 09:15

## 2022-10-09 RX ADMIN — NYSTATIN: 100000 POWDER TOPICAL at 09:20

## 2022-10-09 RX ADMIN — HYDROMORPHONE HYDROCHLORIDE 0.5 MG: 1 INJECTION, SOLUTION INTRAMUSCULAR; INTRAVENOUS; SUBCUTANEOUS at 12:32

## 2022-10-09 RX ADMIN — BACITRACIN 1 SMALL APPLICATION: 500 OINTMENT TOPICAL at 09:16

## 2022-10-09 RX ADMIN — HYDROMORPHONE HYDROCHLORIDE 0.5 MG: 1 INJECTION, SOLUTION INTRAMUSCULAR; INTRAVENOUS; SUBCUTANEOUS at 06:35

## 2022-10-09 RX ADMIN — LEVOTHYROXINE SODIUM 125 MCG: 125 TABLET ORAL at 05:09

## 2022-10-09 RX ADMIN — FLUOXETINE HYDROCHLORIDE 40 MG: 20 CAPSULE ORAL at 09:16

## 2022-10-09 RX ADMIN — MAGNESIUM OXIDE TAB 400 MG (241.3 MG ELEMENTAL MG) 400 MG: 400 (241.3 MG) TAB at 09:19

## 2022-10-09 NOTE — ASSESSMENT & PLAN NOTE
Likely due to ABX use  · Diflucan 100 mg po x1 on 10/07  · Follow-up outpatient PCP 1-2 weeks post discharge

## 2022-10-09 NOTE — ASSESSMENT & PLAN NOTE
Phos 2 2, K 3 6  · Phosphorus improved to 3 3, potassium 4 3  · Follow-up outpatient PCP 1-2 weeks post discharge

## 2022-10-09 NOTE — ASSESSMENT & PLAN NOTE
Hst of lung cancer on hospice with recent diagnosis of C Diff Colitis  Discharged home from [facility] on Saturday  Since then she has been nauseous with vomiting and diarrhea  She has been unable to eat or drink  Now with weakness and falls  · Completed course of flagyl and cipro on 10/8  · Diet as tolerated; patient reports improvement; feels reglan and scopalamine patch helping; will continue on discharge   · Will also continue zofran and rescue phenergan for nausea on discharge  · Will follow up with PCP in one to two weeks

## 2022-10-09 NOTE — ASSESSMENT & PLAN NOTE
With falls 2/2 to nausea, vomiting, and diarrhea  Unable to eat or drink for several days    · Supportive care as above  · PT/OT eval; recommend HHS  · Patient will be staying with a friend/sister on discharge

## 2022-10-09 NOTE — ASSESSMENT & PLAN NOTE
On hospice with [facility]  Sent in by hospice nurse due to above     · Previous provider discussed abnormalities noted on CT scan; patient does not want aggressive workup/ management   · Patient will be discharged home today

## 2022-10-09 NOTE — DISCHARGE SUMMARY
Kailash 47  Discharge- Monique Line 1945, 78 y o  female MRN: [id number]  Unit/Bed#: [address] [place] [address] Encounter: [id number]  Primary Care Provider: No primary care provider on file  Date and time admitted to hospital: 10/5/2022  2:42 PM    * Bilious vomiting with nausea  Assessment & Plan  Hst of lung cancer on hospice with recent diagnosis of C Diff Colitis  Discharged home from [facility] on Saturday  Since then she has been nauseous with vomiting and diarrhea  She has been unable to eat or drink  Now with weakness and falls  · Completed course of flagyl and cipro on 10/8  · Diet as tolerated; patient reports improvement; feels reglan and scopalamine patch helping; will continue on discharge   · Will also continue zofran and rescue phenergan for nausea on discharge  · Will follow up with PCP in one to two weeks  Clostridium difficile colitis  Assessment & Plan  Admitted to [facility] 9/27/22 -10/1/22 with C Diff Colitis  Sent home on PO Ciprofloxacin, Flagyl, and Vancomycin pulse taper  This is her second bout of Vancomycin  Updated CT A/P: "Colonic diverticulosis with mild wall thickening involving the sigmoid colon which may represent acute or chronic changes related to diverticulitis  No pericolonic inflammatory changes are noted "  · Completed cipro and flagyl on 10/8  · Continue prolonged PO Vancomycin taper   · Resume  her prescription from home on discharge  · Diet advanced; patient reports improvement  · Discharge to home with plan as noted above     Generalized weakness  Assessment & Plan  With falls 2/2 to nausea, vomiting, and diarrhea  Unable to eat or drink for several days    · Supportive care as above  · PT/OT eval; recommend HHS  · Patient will be staying with a friend/sister on discharge     Pleural effusion  Assessment & Plan  CT shows small to moderate left-sided pleural effusion   Occasionally uses home O2 at 1-2 LPM  Currently on 1 5 LPM with oxygen saturations in the mid 80s  · Currently on room air sating 93-94%    Hypomagnesemia  Assessment & Plan  Mag 1 5 -> 1 6, likely due to N/V/D   · Currently 1 6; oral replacement   · Follow up outpatient PCP 1-2 weeks post discharge  · Patient will be discharged home today      Lung cancer [facility])  Assessment & Plan  On hospice with [facility]  Sent in by hospice nurse due to above  · Previous provider discussed abnormalities noted on CT scan; patient does not want aggressive workup/ management   · Patient will be discharged home today    Oral thrush  Assessment & Plan  · Improved    Yeast infection  Assessment & Plan  Likely due to ABX use  · Diflucan 100 mg po x1 on 10/07  · Follow-up outpatient PCP 1-2 weeks post discharge    Hypophosphatemia  Assessment & Plan  Phos 2 2, K 3 6  · Phosphorus improved to 3 3, potassium 4 3  · Follow-up outpatient PCP 1-2 weeks post discharge           Discharging Physician / Practitioner: Valentina Smoker  PCP: No primary care provider on file    Admission Date: 10/5/2022  Discharge Date: 10/09/22    Reason for Admission: Vomiting (Hospice pt for lung cancer, discharged from [facility] 5 days ago, unable to keep anything down, vomiting, was dx with diverticulitis and c diff)        Medical Problems             Resolved Problems  Date Reviewed: 10/9/2022   None                 Consultations During Hospital Stay:  None    Procedures Performed:     · None    Significant Findings / Test Results:     · See below  Results from last 7 days   Lab Units 10/09/22  0507 10/08/22  0616 10/06/22  0539   WBC Thousand/uL 9 86 10 61* 8 40   HEMOGLOBIN g/dL 11 0* 10 9* 12 8   PLATELETS Thousands/uL 243 237 287     Results from last 7 days   Lab Units 10/09/22  0507 10/08/22  0616 10/07/22  0516 10/06/22  0539 10/05/22  1549   SODIUM mmol/L 139 138 138 139 139   POTASSIUM mmol/L 4 3 3 9 3 6 3 3* 3 7   CHLORIDE mmol/L 103 102 103 102 101   CO2 mmol/L 33* 31 31 33* 30   BUN mg/dL 8 6 4* 6 9   CREATININE mg/dL 0 87 0  71 0 79 0 80 0 83   CALCIUM mg/dL 8 0* 8 2* 8 0* 8 4 8 6   TOTAL BILIRUBIN mg/dL  --  0 46  --  0 36 0 34   ALK PHOS U/L  --  48  --  56 61   ALT U/L  --  8*  --  12 11*   AST U/L  --  13  --  22 28             No results found for: HGBA1C          Blood Culture: No results found for: BLOODCX  Urine Culture: No results found for: URINECX  Sputum Culture: No components found for: SPUTUMCX  Wound Culture: No results found for: WOUNDCULT     CT head without contrast   Final Result by Vahe Burger MD (10/05 1658)      Hypodense areas in the left frontal and left occipital lobes which may represent prior infarcts, however, metastatic disease is not excluded in this setting and consider MRI for further evaluation if clinically indicated  Any prior studies would    certainly be helpful in this regard  The study was marked in EPIC for significant notification and follow-up notification  Workstation performed: [id number]         CT chest abdomen pelvis w contrast   Final Result by Vahe Burger MD (10/05 1656)      Background emphysema with postsurgical changes in the left upper lobe  Complete collapse of the left upper lobe with obstruction of the left upper lobe bronchus for which underlying endobronchial lesion is not excluded  Prior exams would be helpful to    assess for stability  Multiple nodules scattered throughout the right lung the largest in the right lower lobe measuring 5 mm  Continued follow-up suggested  Small to moderate left-sided pleural effusion of uncertain etiology  Colonic diverticulosis with mild wall thickening involving the sigmoid colon which may represent acute or chronic changes related to diverticulitis  No pericolonic inflammatory changes are noted  Workstation performed: [id number]                Incidental Findings:   · See above, discussed with patient    Test Results Pending at Discharge (will require follow up):    · None     Outpatient Tests Requested:  · None    Complications:  None    Reason for Admission:   Chief Complaint   Patient presents with   • Vomiting     Hospice pt for lung cancer, discharged from [address] 5 days ago, unable to keep anything down, vomiting, was dx with diverticulitis and c diff       Hospital Course:     Per HPI: Marge Cortez is a 78 y o  female patient with a PMH of lung cancer on hospice, GERD, hypothyroidism and C diff who originally presented to the hospital on 10/5/2022 due to nausea, vomiting and diarrhea  Patient was recently admitted at [facility] for C diff colitis  She was discharged home on Saturday with Flagyl, Cipro and vancomycin  She was not able to eat or drink since coming home  She became very weak and was falling  She presented to the hospital for further evaluation and treatment, CT of chest showed collapse of left upper lobe with obstruction left upper lobe bronchus, unclear if acute or chronic  CT abdomen showed colonic diverticulitis  CT of the head revealed hypodense areas left frontal and left occipital lobes representing prior infarcts verses metastasis  Previous provider did discuss this with the patient, G does not want to be aggressive with her treatment towards the lung cancer rather with focus on taking care of her C diff colitis  During hospitalization patient was placed on IV Cipro and Flagyl which she finished her course on 10/08, and continues on oral vancomycin taper  She was placed on oral Reglan, scopolamine patch, p r n  Zofran and p r n  Phenergan if Zofran does not work  This regimen seems to be helping as patient was able to tolerate more of her diet, less nausea or vomiting  PT/OT evaluation treatment were performed, they recommend home with health services  Patient reported that she initially will not be going home rather will be staying with friends and her sister in the area  Her goal is ultimately to get back home    She will continue to follow with her outpatient hospice team and her primary care physician  She will be discharged home today  Hospital Course:    Please see above list of diagnoses and related plan for additional information  Condition at Discharge: fair       Discharge Day Visit / Exam:     Subjective:  “I think that Reglan and scopolamine are really helping, I feel much less nauseous and was able to keep some breakfast down  I slept like a baby last night  I am hoping I can go home today”  Vitals: Blood Pressure: 110/63 (10/09/22 0814)  Pulse: 70 (10/09/22 0814)  Temperature: 98 3 °F (36 8 °C) (10/09/22 0814)  Temp Source: Oral (10/08/22 2123)  Respirations: 18 (10/09/22 0005)  Height: 5' 3" (160 cm) (10/05/22 2248)  Weight - Scale: 81 7 kg (180 lb 3 1 oz) (10/09/22 0600)  SpO2: 97 % (10/09/22 0814)     Exam:   Physical Exam  Vitals and nursing note reviewed  Constitutional:       General: She is not in acute distress  HENT:      Head: Normocephalic  Nose: Nose normal       Mouth/Throat:      Mouth: Mucous membranes are moist    Eyes:      Extraocular Movements: Extraocular movements intact  Conjunctiva/sclera: Conjunctivae normal       Pupils: Pupils are equal, round, and reactive to light  Cardiovascular:      Rate and Rhythm: Normal rate and regular rhythm  Pulses: Normal pulses  Heart sounds: Normal heart sounds  Pulmonary:      Effort: Pulmonary effort is normal       Comments: Diminished at bases  Abdominal:      General: Bowel sounds are normal  There is no distension  Palpations: Abdomen is soft  Tenderness: There is no abdominal tenderness  Genitourinary:     Comments: Voiding spontaneously  Musculoskeletal:         General: Normal range of motion  Cervical back: Normal range of motion  Skin:     General: Skin is warm and dry  Capillary Refill: Capillary refill takes less than 2 seconds  Neurological:      General: No focal deficit present        Mental Status: She is alert and oriented to person, place, and time  Psychiatric:         Mood and Affect: Mood normal          Behavior: Behavior normal          Thought Content: Thought content normal          Judgment: Judgment normal            Discharge instructions/Information to patient and family:   See after visit summary for information provided to patient and family  Provisions for Follow-Up Care:  See after visit summary for information related to follow-up care and any pertinent home health orders  Disposition:     Other: Home with [facility]    Planned Readmission: No      Discharge Statement:  I spent greater than 30 minutes discharging the patient  This time was spent on the day of discharge  I had direct contact with the patient on the day of discharge  Greater than 50% of the total time was spent examining patient, answering all patient questions, arranging and discussing plan of care with patient as well as directly providing post-discharge instructions  Additional time then spent on discharge activities  Discharge Medications:  See after visit summary for reconciled discharge medications provided to patient and family        ** Please Note: This note has been constructed using a voice recognition system **

## 2022-10-09 NOTE — ASSESSMENT & PLAN NOTE
Admitted to [facility] 9/27/22 -10/1/22 with C Diff Colitis  Sent home on PO Ciprofloxacin, Flagyl, and Vancomycin pulse taper  This is her second bout of Vancomycin  Updated CT A/P: "Colonic diverticulosis with mild wall thickening involving the sigmoid colon which may represent acute or chronic changes related to diverticulitis    No pericolonic inflammatory changes are noted "  · Completed cipro and flagyl on 10/8  · Continue prolonged PO Vancomycin taper   · Resume  her prescription from home on discharge  · Diet advanced; patient reports improvement  · Discharge to home with plan as noted above

## 2022-10-09 NOTE — ASSESSMENT & PLAN NOTE
Mag 1 5 -> 1 6, likely due to N/V/D   · Currently 1 6; oral replacement   · Follow up outpatient PCP 1-2 weeks post discharge  · Patient will be discharged home today

## 2022-10-09 NOTE — CASE MANAGEMENT
Case Management Discharge Planning Note    Patient name Nargis Duncan  Location [address] [address] Jagjit MRN [id number]  : 1945 Date 10/9/2022       Current Admission Date: 10/5/2022  Current Admission Diagnosis:Bilious vomiting with nausea   Patient Active Problem List    Diagnosis Date Noted   • Yeast infection 10/07/2022   • Hypophosphatemia 10/07/2022   • Oral thrush 10/06/2022   • Hypomagnesemia 10/05/2022   • Clostridium difficile colitis 10/05/2022   • Bilious vomiting with nausea 10/05/2022   • Generalized weakness 10/05/2022   • Lung cancer ([facility] [address] ) 10/05/2022   • Pleural effusion 10/05/2022      LOS (days): 3  Geometric Mean LOS (GMLOS) (days): 2 60  Days to GMLOS:-0 2     OBJECTIVE:  Risk of Unplanned Readmission Score: 17 29       Current admission status: Inpatient   Preferred Pharmacy:   [address] [place] [address]  Phone: [phone] Fax: [phone]    Primary Care Provider: No primary care provider on file  Primary Insurance: [facility]  Secondary Insurance:     DISCHARGE DETAILS:     Treatment Team Recommendation: Hospice, Home  Discharge Destination Plan[de-identified] Home, Hospice  Transport at Discharge : Family      SW notified of patient's planned discharge today  SW called [phone] [phone] and spoke with Lovely Diaz to notify of patient's discharge to home

## 2022-10-09 NOTE — ASSESSMENT & PLAN NOTE
CT shows small to moderate left-sided pleural effusion   Occasionally uses home O2 at 1-2 LPM  Currently on 1 5 LPM with oxygen saturations in the mid 90s     · Currently on room air sating 93-94%